# Patient Record
Sex: MALE | Race: WHITE | NOT HISPANIC OR LATINO
[De-identification: names, ages, dates, MRNs, and addresses within clinical notes are randomized per-mention and may not be internally consistent; named-entity substitution may affect disease eponyms.]

---

## 2017-06-29 ENCOUNTER — TRANSCRIPTION ENCOUNTER (OUTPATIENT)
Age: 72
End: 2017-06-29

## 2017-08-08 ENCOUNTER — TRANSCRIPTION ENCOUNTER (OUTPATIENT)
Age: 72
End: 2017-08-08

## 2018-10-20 ENCOUNTER — TRANSCRIPTION ENCOUNTER (OUTPATIENT)
Age: 73
End: 2018-10-20

## 2018-11-01 ENCOUNTER — TRANSCRIPTION ENCOUNTER (OUTPATIENT)
Age: 73
End: 2018-11-01

## 2018-11-07 ENCOUNTER — TRANSCRIPTION ENCOUNTER (OUTPATIENT)
Age: 73
End: 2018-11-07

## 2018-11-08 ENCOUNTER — RESULT REVIEW (OUTPATIENT)
Age: 73
End: 2018-11-08

## 2018-11-08 ENCOUNTER — OUTPATIENT (OUTPATIENT)
Dept: OUTPATIENT SERVICES | Facility: HOSPITAL | Age: 73
LOS: 1 days | End: 2018-11-08
Payer: MEDICARE

## 2018-11-08 DIAGNOSIS — D48.1 NEOPLASM OF UNCERTAIN BEHAVIOR OF CONNECTIVE AND OTHER SOFT TISSUE: ICD-10-CM

## 2018-11-08 PROCEDURE — 21931 EXC BACK LES SC 3 CM/>: CPT

## 2018-11-08 PROCEDURE — 88304 TISSUE EXAM BY PATHOLOGIST: CPT

## 2018-11-08 PROCEDURE — 88304 TISSUE EXAM BY PATHOLOGIST: CPT | Mod: 26

## 2018-11-12 LAB — SURGICAL PATHOLOGY FINAL REPORT - CH: SIGNIFICANT CHANGE UP

## 2019-03-07 ENCOUNTER — RECORD ABSTRACTING (OUTPATIENT)
Age: 74
End: 2019-03-07

## 2019-03-07 DIAGNOSIS — Z86.59 PERSONAL HISTORY OF OTHER MENTAL AND BEHAVIORAL DISORDERS: ICD-10-CM

## 2019-03-07 DIAGNOSIS — Z78.9 OTHER SPECIFIED HEALTH STATUS: ICD-10-CM

## 2019-03-07 DIAGNOSIS — E56.9 VITAMIN DEFICIENCY, UNSPECIFIED: ICD-10-CM

## 2019-03-07 DIAGNOSIS — Z95.2 PRESENCE OF PROSTHETIC HEART VALVE: ICD-10-CM

## 2019-03-07 DIAGNOSIS — Z80.1 FAMILY HISTORY OF MALIGNANT NEOPLASM OF TRACHEA, BRONCHUS AND LUNG: ICD-10-CM

## 2019-03-07 RX ORDER — CALCIUM CARBONATE/VITAMIN D3 600 MG-10
TABLET ORAL
Refills: 0 | Status: ACTIVE | COMMUNITY

## 2019-03-07 RX ORDER — MULTIVITAMIN
TABLET ORAL DAILY
Refills: 0 | Status: ACTIVE | COMMUNITY

## 2019-03-07 RX ORDER — ADHESIVE TAPE 3"X 2.3 YD
50 MCG TAPE, NON-MEDICATED TOPICAL
Refills: 0 | Status: ACTIVE | COMMUNITY

## 2019-03-15 ENCOUNTER — APPOINTMENT (OUTPATIENT)
Dept: INTERNAL MEDICINE | Facility: CLINIC | Age: 74
End: 2019-03-15
Payer: MEDICARE

## 2019-03-15 VITALS
DIASTOLIC BLOOD PRESSURE: 80 MMHG | BODY MASS INDEX: 30.61 KG/M2 | HEART RATE: 88 BPM | SYSTOLIC BLOOD PRESSURE: 130 MMHG | WEIGHT: 195 LBS | HEIGHT: 67 IN | OXYGEN SATURATION: 98 % | RESPIRATION RATE: 16 BRPM

## 2019-03-15 DIAGNOSIS — Z80.3 FAMILY HISTORY OF MALIGNANT NEOPLASM OF BREAST: ICD-10-CM

## 2019-03-15 DIAGNOSIS — Z80.8 FAMILY HISTORY OF MALIGNANT NEOPLASM OF OTHER ORGANS OR SYSTEMS: ICD-10-CM

## 2019-03-15 PROCEDURE — 99213 OFFICE O/P EST LOW 20 MIN: CPT

## 2019-03-15 NOTE — PHYSICAL EXAM
[No Hernias] : no hernias [de-identified] : Burn scar left lower extremity shin area [No Acute Distress] : no acute distress [Well Nourished] : well nourished [Well Developed] : well developed [Well-Appearing] : well-appearing [Normal Sclera/Conjunctiva] : normal sclera/conjunctiva [PERRL] : pupils equal round and reactive to light [EOMI] : extraocular movements intact [Normal Outer Ear/Nose] : the outer ears and nose were normal in appearance [Normal Oropharynx] : the oropharynx was normal [No JVD] : no jugular venous distention [Supple] : supple [No Lymphadenopathy] : no lymphadenopathy [Thyroid Normal, No Nodules] : the thyroid was normal and there were no nodules present [No Respiratory Distress] : no respiratory distress  [Clear to Auscultation] : lungs were clear to auscultation bilaterally [No Accessory Muscle Use] : no accessory muscle use [Normal Rate] : normal rate  [Regular Rhythm] : with a regular rhythm [Normal S1, S2] : normal S1 and S2 [No Carotid Bruits] : no carotid bruits [No Abdominal Bruit] : a ~M bruit was not heard ~T in the abdomen [No Varicosities] : no varicosities [Pedal Pulses Present] : the pedal pulses are present [No Edema] : there was no peripheral edema [No Extremity Clubbing/Cyanosis] : no extremity clubbing/cyanosis [No Palpable Aorta] : no palpable aorta [Soft] : abdomen soft [Non Tender] : non-tender [Non-distended] : non-distended [No Masses] : no abdominal mass palpated [No HSM] : no HSM [Normal Bowel Sounds] : normal bowel sounds [Normal Posterior Cervical Nodes] : no posterior cervical lymphadenopathy [Normal Anterior Cervical Nodes] : no anterior cervical lymphadenopathy [No CVA Tenderness] : no CVA  tenderness [No Spinal Tenderness] : no spinal tenderness [No Joint Swelling] : no joint swelling [Grossly Normal Strength/Tone] : grossly normal strength/tone [No Rash] : no rash [Normal Gait] : normal gait [Coordination Grossly Intact] : coordination grossly intact [No Focal Deficits] : no focal deficits [Deep Tendon Reflexes (DTR)] : deep tendon reflexes were 2+ and symmetric [Normal Affect] : the affect was normal [Normal Insight/Judgement] : insight and judgment were intact

## 2019-03-15 NOTE — HISTORY OF PRESENT ILLNESS
[FreeTextEntry8] : A 73-year-old male who presents to the office for evaluation of cold like symptoms for a week. Patient states he had a little nasal congestion slight cough and some acid reflux. Patient states the cough and nasal congestion has improved drastically. But still with acid reflux. Patient states that he's been taking Axid 300 mg daily which has been helping greatly.\par Patient states he's been seen in urology for evaluation of an abnormal MRI of the prostate. He is waiting to be scheduled for prostate biopsy.

## 2019-03-15 NOTE — ASSESSMENT
[FreeTextEntry1] : A 73-year-old male who presents to the office for evaluation of his reflux and cold-like symptoms

## 2019-03-15 NOTE — REVIEW OF SYSTEMS
[Heartburn] : heartburn [FreeTextEntry8] : elevated psa  [Nasal Discharge] : nasal discharge [Cough] : cough [Negative] : Heme/Lymph [FreeTextEntry7] : elevated PSA

## 2019-03-15 NOTE — HEALTH RISK ASSESSMENT
[de-identified] : blink 2-3 days aweek [] : No [No falls in past year] : Patient reported no falls in the past year [0] : 2) Feeling down, depressed, or hopeless: Not at all (0) [de-identified] : regular diet

## 2019-03-15 NOTE — HEALTH RISK ASSESSMENT
[de-identified] : blink 2-3 days aweek [] : No [No falls in past year] : Patient reported no falls in the past year [0] : 2) Feeling down, depressed, or hopeless: Not at all (0) [de-identified] : regular diet

## 2019-03-15 NOTE — PHYSICAL EXAM
[No Hernias] : no hernias [de-identified] : Burn scar left lower extremity shin area [No Acute Distress] : no acute distress [Well Nourished] : well nourished [Well Developed] : well developed [Well-Appearing] : well-appearing [Normal Sclera/Conjunctiva] : normal sclera/conjunctiva [PERRL] : pupils equal round and reactive to light [EOMI] : extraocular movements intact [Normal Outer Ear/Nose] : the outer ears and nose were normal in appearance [Normal Oropharynx] : the oropharynx was normal [No JVD] : no jugular venous distention [Supple] : supple [No Lymphadenopathy] : no lymphadenopathy [Thyroid Normal, No Nodules] : the thyroid was normal and there were no nodules present [No Respiratory Distress] : no respiratory distress  [Clear to Auscultation] : lungs were clear to auscultation bilaterally [No Accessory Muscle Use] : no accessory muscle use [Normal Rate] : normal rate  [Regular Rhythm] : with a regular rhythm [Normal S1, S2] : normal S1 and S2 [No Carotid Bruits] : no carotid bruits [No Abdominal Bruit] : a ~M bruit was not heard ~T in the abdomen [No Varicosities] : no varicosities [Pedal Pulses Present] : the pedal pulses are present [No Edema] : there was no peripheral edema [No Extremity Clubbing/Cyanosis] : no extremity clubbing/cyanosis [No Palpable Aorta] : no palpable aorta [Soft] : abdomen soft [Non Tender] : non-tender [Non-distended] : non-distended [No Masses] : no abdominal mass palpated [No HSM] : no HSM [Normal Bowel Sounds] : normal bowel sounds [Normal Posterior Cervical Nodes] : no posterior cervical lymphadenopathy [Normal Anterior Cervical Nodes] : no anterior cervical lymphadenopathy [No CVA Tenderness] : no CVA  tenderness [No Spinal Tenderness] : no spinal tenderness [No Joint Swelling] : no joint swelling [Grossly Normal Strength/Tone] : grossly normal strength/tone [No Rash] : no rash [Normal Gait] : normal gait [Coordination Grossly Intact] : coordination grossly intact [No Focal Deficits] : no focal deficits [Deep Tendon Reflexes (DTR)] : deep tendon reflexes were 2+ and symmetric [Normal Affect] : the affect was normal [Normal Insight/Judgement] : insight and judgment were intact

## 2019-03-15 NOTE — PLAN
[FreeTextEntry1] : Ears nose throat-  exam unremarkable. More likely viral etiology. Advised increased fluids and rest. If symptoms seem to be progressing to call the office for reevaluation.\par \par GI- GERD-  advised patient to low acid diet.  Refilled Axid 300 mg daily. Advised patient to eat small frequent meals. Avoid lying down after eating. If not improving follow up with gastroenterology for an endoscopy.\par \par Urology-elevated PSA- abnormal MRI of the prostate. Advised patient to follow with urologist. Return to office for medical clearance for prostate biopsy.

## 2019-03-15 NOTE — COUNSELING
[Weight management counseling provided] : Weight management [Healthy eating counseling provided] : healthy eating [Good understanding] : Patient has a good understanding of disease, goals and obesity follow-up plan [Low Fat Diet] : Low fat diet [Low Salt Diet] : Low salt diet [Decrease Portions] : Decrease food portions [Walking] : Walking

## 2019-03-29 ENCOUNTER — APPOINTMENT (OUTPATIENT)
Dept: INTERNAL MEDICINE | Facility: CLINIC | Age: 74
End: 2019-03-29
Payer: MEDICARE

## 2019-03-29 ENCOUNTER — NON-APPOINTMENT (OUTPATIENT)
Age: 74
End: 2019-03-29

## 2019-03-29 VITALS
RESPIRATION RATE: 16 BRPM | WEIGHT: 197 LBS | HEART RATE: 96 BPM | SYSTOLIC BLOOD PRESSURE: 130 MMHG | OXYGEN SATURATION: 98 % | HEIGHT: 67 IN | TEMPERATURE: 97.9 F | DIASTOLIC BLOOD PRESSURE: 80 MMHG | BODY MASS INDEX: 30.92 KG/M2

## 2019-03-29 DIAGNOSIS — Z87.898 PERSONAL HISTORY OF OTHER SPECIFIED CONDITIONS: ICD-10-CM

## 2019-03-29 DIAGNOSIS — N40.2 NODULAR PROSTATE W/OUT LOWER URINARY TRACT SYMPTOMS: ICD-10-CM

## 2019-03-29 PROCEDURE — 36415 COLL VENOUS BLD VENIPUNCTURE: CPT

## 2019-03-29 PROCEDURE — 99214 OFFICE O/P EST MOD 30 MIN: CPT | Mod: 25

## 2019-03-29 PROCEDURE — 81003 URINALYSIS AUTO W/O SCOPE: CPT | Mod: QW

## 2019-03-29 PROCEDURE — 93000 ELECTROCARDIOGRAM COMPLETE: CPT

## 2019-03-29 RX ORDER — ESCITALOPRAM OXALATE 5 MG/1
5 TABLET, FILM COATED ORAL DAILY
Refills: 0 | Status: DISCONTINUED | COMMUNITY
End: 2019-03-29

## 2019-03-29 RX ORDER — CHLORDIAZEPOXIDE HYDROCHLORIDE AND CLIDINIUM BROMIDE 5; 2.5 MG/1; MG/1
5-2.5 CAPSULE ORAL
Refills: 0 | Status: DISCONTINUED | COMMUNITY
End: 2019-03-29

## 2019-03-29 NOTE — RESULTS/DATA
[] : results reviewed [de-identified] : EKG normal sinus rhythm at 90 bpm, poor R-wave progression. No change from prior work up with cardiologist 10 years ago.

## 2019-03-29 NOTE — PLAN
[FreeTextEntry1] : 1. Preoperative EKG performed - see above - no change from prior \par 2.. The patient was advised to stop medications/supplements 10 day prior to the procedure, except Tylenol. \par 3. There is no medical contraindication to the planned procedure and the patient is therefore medically optimized and is therefore medically cleared for the procedure.\par

## 2019-03-29 NOTE — HISTORY OF PRESENT ILLNESS
[No Pertinent Cardiac History] : no history of aortic stenosis, atrial fibrillation, coronary artery disease, recent myocardial infarction, or implantable device/pacemaker [No Pertinent Pulmonary History] : no history of asthma, COPD, sleep apnea, or smoking [No Adverse Anesthesia Reaction] : no adverse anesthesia reaction in self or family member [FreeTextEntry1] : Prostate Biopsy [FreeTextEntry2] : 4/16/19 [FreeTextEntry3] : Dr. Darrin Fortune [FreeTextEntry4] : Patient is a 73 year year old  male with a past medical history as below who presents for preoperative evaluation prior to prostate biopsy procedure. The procedure will be performed by Dr. Fortune. Recent prostate MRI was abnormal. Patient recently had a UTI that resolved. The patient has no history of allergy or adverse reaction to anesthesia. The patient can walk many blocks and can walk up one to 2 flights of stairs without dyspnea on exertion. The patient denies chest pain or palpitations. The patient states he has stopped taking Lexapro. He states he is not currently taking any medications aside from Axid for heartburn/reflux. The patient states he takes Vitamin D, fish oil, and multi B-vitamins. He also states that he attends the gym 2-3 times per week.

## 2019-03-29 NOTE — ADDENDUM
[FreeTextEntry1] : I, Marine Mahamed, acted solely as scribe for Dr. Kelvin Pimentel DO on this date Mar 29 2019  2:00PM .\par \par All medical record entries made by the Scribe were at my, Dr. Kelvin Pimentel DO direction and personally dictated by me on Mar 29 2019  2:00PM . I have reviewed the chart and agree that the record accurately reflects my personal performance of the history, physical exam, assessment and plan. I have also personally directed, reviewed and agreed with the chart.

## 2019-03-29 NOTE — PHYSICAL EXAM

## 2019-03-29 NOTE — ASSESSMENT
[FreeTextEntry4] : The patient is 73 year male who is a low risk surgical candidate with good functional capacity going for a low-intermediate risk surgical procedure.\par

## 2019-04-02 LAB
ALBUMIN SERPL ELPH-MCNC: 4.5 G/DL
ALP BLD-CCNC: 95 U/L
ALT SERPL-CCNC: 28 U/L
ANION GAP SERPL CALC-SCNC: 15 MMOL/L
APPEARANCE: CLEAR
AST SERPL-CCNC: 25 U/L
BASOPHILS # BLD AUTO: 0.04 K/UL
BASOPHILS NFR BLD AUTO: 0.5 %
BILIRUB SERPL-MCNC: 0.4 MG/DL
BILIRUBIN URINE: NEGATIVE
BLOOD URINE: NEGATIVE
BUN SERPL-MCNC: 21 MG/DL
CALCIUM SERPL-MCNC: 10 MG/DL
CHLORIDE SERPL-SCNC: 103 MMOL/L
CO2 SERPL-SCNC: 24 MMOL/L
COLOR: NORMAL
CREAT SERPL-MCNC: 1.45 MG/DL
EOSINOPHIL # BLD AUTO: 0.46 K/UL
EOSINOPHIL NFR BLD AUTO: 5.6 %
GLUCOSE QUALITATIVE U: NEGATIVE
GLUCOSE SERPL-MCNC: 97 MG/DL
HCT VFR BLD CALC: 46.8 %
HGB BLD-MCNC: 13.8 G/DL
IMM GRANULOCYTES NFR BLD AUTO: 0.4 %
INR PPP: 0.97 RATIO
KETONES URINE: NEGATIVE
LEUKOCYTE ESTERASE URINE: NEGATIVE
LYMPHOCYTES # BLD AUTO: 2.14 K/UL
LYMPHOCYTES NFR BLD AUTO: 26.1 %
MAN DIFF?: NORMAL
MCHC RBC-ENTMCNC: 19.1 PG
MCHC RBC-ENTMCNC: 29.5 GM/DL
MCV RBC AUTO: 64.9 FL
MONOCYTES # BLD AUTO: 0.85 K/UL
MONOCYTES NFR BLD AUTO: 10.4 %
NEUTROPHILS # BLD AUTO: 4.68 K/UL
NEUTROPHILS NFR BLD AUTO: 57 %
NITRITE URINE: NEGATIVE
PH URINE: 5
PLATELET # BLD AUTO: 226 K/UL
POTASSIUM SERPL-SCNC: 5.3 MMOL/L
PROT SERPL-MCNC: 7.5 G/DL
PROTEIN URINE: NEGATIVE
PSA FREE FLD-MCNC: 16 %
PSA FREE SERPL-MCNC: 1.72 NG/ML
PSA SERPL-MCNC: 10.6 NG/ML
PT BLD: 11 SEC
RBC # BLD: 7.21 M/UL
RBC # FLD: 20.2 %
SODIUM SERPL-SCNC: 142 MMOL/L
SPECIFIC GRAVITY URINE: 1.02
UROBILINOGEN URINE: NORMAL
WBC # FLD AUTO: 8.2 K/UL

## 2019-05-30 ENCOUNTER — OUTPATIENT (OUTPATIENT)
Dept: OUTPATIENT SERVICES | Facility: HOSPITAL | Age: 74
LOS: 1 days | Discharge: HOME | End: 2019-05-30

## 2019-06-01 ENCOUNTER — TRANSCRIPTION ENCOUNTER (OUTPATIENT)
Age: 74
End: 2019-06-01

## 2019-06-03 ENCOUNTER — APPOINTMENT (OUTPATIENT)
Dept: RADIATION ONCOLOGY | Facility: CLINIC | Age: 74
End: 2019-06-03

## 2019-12-18 ENCOUNTER — APPOINTMENT (OUTPATIENT)
Dept: INTERNAL MEDICINE | Facility: CLINIC | Age: 74
End: 2019-12-18
Payer: MEDICARE

## 2019-12-18 VITALS
HEART RATE: 90 BPM | TEMPERATURE: 97.4 F | RESPIRATION RATE: 16 BRPM | HEIGHT: 67 IN | WEIGHT: 200 LBS | BODY MASS INDEX: 31.39 KG/M2 | SYSTOLIC BLOOD PRESSURE: 128 MMHG | OXYGEN SATURATION: 98 % | DIASTOLIC BLOOD PRESSURE: 82 MMHG

## 2019-12-18 DIAGNOSIS — Z92.3 PERSONAL HISTORY OF IRRADIATION: ICD-10-CM

## 2019-12-18 DIAGNOSIS — J06.9 ACUTE UPPER RESPIRATORY INFECTION, UNSPECIFIED: ICD-10-CM

## 2019-12-18 DIAGNOSIS — B97.89 ACUTE UPPER RESPIRATORY INFECTION, UNSPECIFIED: ICD-10-CM

## 2019-12-18 PROCEDURE — 99214 OFFICE O/P EST MOD 30 MIN: CPT | Mod: 25

## 2019-12-18 PROCEDURE — G0444 DEPRESSION SCREEN ANNUAL: CPT | Mod: 59

## 2019-12-18 PROCEDURE — G0442 ANNUAL ALCOHOL SCREEN 15 MIN: CPT | Mod: 59

## 2019-12-18 NOTE — REVIEW OF SYSTEMS
[Negative] : Heme/Lymph [Sore Throat] : sore throat [Earache] : earache [Cough] : cough [Fever] : no fever [Chills] : no chills [FreeTextEntry4] : nasal congestion

## 2019-12-18 NOTE — ADDENDUM
[FreeTextEntry1] : I, Kael Ferreira, acted solely as scribe for Dr. Kelvin Pimentel DO on this date 12/18/2019  7:00AM .\par \par All medical record entries made by the Scribe were at my, Dr. Kelvin Pimentel DO direction and personally dictated by me on 12/18/2019  7:00AM. I have reviewed the chart and agree that the record accurately reflects my personal performance of the history, physical exam, assessment and plan. I have also personally directed, reviewed and agreed with the chart.\par

## 2019-12-18 NOTE — HEALTH RISK ASSESSMENT
[No] : In the past 12 months have you used drugs other than those required for medical reasons? No [0] : 2) Feeling down, depressed, or hopeless: Not at all (0) [] : No [Audit-CScore] : 0 [[id number]] : 0

## 2019-12-18 NOTE — HISTORY OF PRESENT ILLNESS
[FreeTextEntry1] : cough [de-identified] : Patient is a 74 year old male with a past medical history as below who presents with a cough that started about 5 days ago. He also notes nasal congestion, a sore throat, and earache that began 2 weeks ago. He denies expectorating mucus, fever, or chills. Patient states taking Tessalon Perles have helped with the cough. He notes a family member had also recently been displaying similar symptoms. Patient states he is taking all medications as prescribed and denies any adverse reactions or side effects. GERD is well-managed on Nizatidine. He states he was on a statin in the past, but was taken off the medication as he had noted side effects. Patient is s/p radiation therapy (48 treatments) for prostate cancer. He notes an upcoming appointment with his radiation oncologist. He notes recently restarting vitamins/supplements as he is off Lupron.

## 2019-12-18 NOTE — PHYSICAL EXAM
[No Acute Distress] : no acute distress [Well Nourished] : well nourished [Well Developed] : well developed [Normal Sclera/Conjunctiva] : normal sclera/conjunctiva [PERRL] : pupils equal round and reactive to light [Well-Appearing] : well-appearing [EOMI] : extraocular movements intact [Normal Outer Ear/Nose] : the outer ears and nose were normal in appearance [No Lymphadenopathy] : no lymphadenopathy [No JVD] : no jugular venous distention [Supple] : supple [Thyroid Normal, No Nodules] : the thyroid was normal and there were no nodules present [No Respiratory Distress] : no respiratory distress  [Clear to Auscultation] : lungs were clear to auscultation bilaterally [No Accessory Muscle Use] : no accessory muscle use [Normal Rate] : normal rate  [Regular Rhythm] : with a regular rhythm [Normal S1, S2] : normal S1 and S2 [No Murmur] : no murmur heard [No Carotid Bruits] : no carotid bruits [No Abdominal Bruit] : a ~M bruit was not heard ~T in the abdomen [Pedal Pulses Present] : the pedal pulses are present [No Varicosities] : no varicosities [No Edema] : there was no peripheral edema [No Palpable Aorta] : no palpable aorta [Soft] : abdomen soft [Non Tender] : non-tender [No Extremity Clubbing/Cyanosis] : no extremity clubbing/cyanosis [Non-distended] : non-distended [No Masses] : no abdominal mass palpated [No HSM] : no HSM [Normal Bowel Sounds] : normal bowel sounds [Normal Anterior Cervical Nodes] : no anterior cervical lymphadenopathy [Normal Posterior Cervical Nodes] : no posterior cervical lymphadenopathy [No Spinal Tenderness] : no spinal tenderness [No CVA Tenderness] : no CVA  tenderness [No Joint Swelling] : no joint swelling [Grossly Normal Strength/Tone] : grossly normal strength/tone [No Rash] : no rash [Normal Gait] : normal gait [Coordination Grossly Intact] : coordination grossly intact [No Focal Deficits] : no focal deficits [Normal Affect] : the affect was normal [Deep Tendon Reflexes (DTR)] : deep tendon reflexes were 2+ and symmetric [Normal Insight/Judgement] : insight and judgment were intact [de-identified] : post-nasal drip  [de-identified] : obese

## 2019-12-18 NOTE — ASSESSMENT
[FreeTextEntry1] : Patient is a 74 year old male with a past medical history as above who presents with an upper respiratory tract infection, likely the common cold.

## 2019-12-18 NOTE — PLAN
[FreeTextEntry1] : ENT\par URI/cold - recommended staying well-hydrated and increased rest - patient to follow up if symptoms persist or worsen \par cough - likely secondary to post-nasal drip - continue Tessalon Perles 100mg QID p.o. p.r.n., Rx filled;  recommended non-sedating antihistamine (OTC Claritin, Allegra, or Zyrtec)\par Oncology\par history of prostate cancer - s/p radiation therapy - follow up with radiation oncologist \par Endocrinology\par continue vitamin D-3 2000 unit capsules p.o.q.d. with meals \par Gastroenterology\par GERD - continue Nizatidine 300mg p.o.q.h.s. as directed - continue antireflux measures\par Infectious Disease\par flu vaccine - discussed, patient to follow up as symptoms improve \par \par Patient to follow up for fasting blood work. \par

## 2020-01-21 ENCOUNTER — APPOINTMENT (OUTPATIENT)
Dept: INTERNAL MEDICINE | Facility: CLINIC | Age: 75
End: 2020-01-21
Payer: MEDICARE

## 2020-01-21 ENCOUNTER — NON-APPOINTMENT (OUTPATIENT)
Age: 75
End: 2020-01-21

## 2020-01-21 ENCOUNTER — LABORATORY RESULT (OUTPATIENT)
Age: 75
End: 2020-01-21

## 2020-01-21 VITALS
HEIGHT: 67 IN | DIASTOLIC BLOOD PRESSURE: 74 MMHG | OXYGEN SATURATION: 97 % | SYSTOLIC BLOOD PRESSURE: 136 MMHG | HEART RATE: 88 BPM | RESPIRATION RATE: 16 BRPM | WEIGHT: 195 LBS | BODY MASS INDEX: 30.61 KG/M2 | TEMPERATURE: 97.3 F

## 2020-01-21 DIAGNOSIS — Z11.4 ENCOUNTER FOR SCREENING FOR HUMAN IMMUNODEFICIENCY VIRUS [HIV]: ICD-10-CM

## 2020-01-21 DIAGNOSIS — Z11.59 ENCOUNTER FOR SCREENING FOR OTHER VIRAL DISEASES: ICD-10-CM

## 2020-01-21 PROCEDURE — 93000 ELECTROCARDIOGRAM COMPLETE: CPT

## 2020-01-21 PROCEDURE — G0439: CPT

## 2020-01-21 PROCEDURE — 36415 COLL VENOUS BLD VENIPUNCTURE: CPT

## 2020-01-21 NOTE — HEALTH RISK ASSESSMENT
[No] : No [0] : 1) Little interest or pleasure doing things: Not at all (0) [HIV Test offered] : HIV Test offered [Hepatitis C test offered] : Hepatitis C test offered [] : No [Audit-CScore] : 0 [[id number]] : 0

## 2020-01-21 NOTE — PLAN
[FreeTextEntry1] : Endocrinology\par hyperlipidemia - continue low cholesterol/low fat diet - check FLP \par continue vitamin D-3 2000 unit capsules p.o.q.d. with meals - check vitamin D\par Oncology/Urology\par prostate cancer - s/p radiation therapy - check PSA - continue to follow up with radiation oncologist, Dr. Pinzon \par urinary frequency/urgency - continue Tamsulosin HCl 0.4mg p.o.q.d. - check PSA - follow up with urologist, Dr. Torres\par Gastroenterology\par diarrhea, vomiting, abdominal cramping - likely secondary to resolving viral gastroenteritis - advised patient to stay well-hydrated - advised to follow up if symptoms persist or worsen \par GERD - continue Nizatidine 300mg p.o.q.h.s. - continue antireflux measures\par Infectious Disease\par check HIV AG/AB screen by [facility] and Hepatitis C Ab\par \par check EKG (results as above), male panel, HIV AG/AB screen by [facility], Hepatitis C Ab, and UA

## 2020-01-21 NOTE — REVIEW OF SYSTEMS
[Vomiting] : vomiting [Diarrhea] : diarrhea [Frequency] : frequency [Dizziness] : dizziness [Negative] : Heme/Lymph [FreeTextEntry7] : abdominal cramping  [FreeTextEntry2] : feeling weak [FreeTextEntry8] : urinary urgency

## 2020-01-21 NOTE — HISTORY OF PRESENT ILLNESS
[FreeTextEntry1] : annual physical exam\par  [de-identified] : Patient is a 74 year old male with a past medical history as below who presents for annual physical exam. Patient states he is taking all medications as prescribed and denies any adverse reactions or side effects. GERD is well-managed on Nizatidine. Patient notes being started on Tamsulosin for urinary frequency/urgency (previously 8x per day). He states urinary symptoms began after starting radiation therapy for prostate cancer. Patient sees radiation oncologist, Dr. Pinzon and notes he will be seeing urologist, Dr. Torres next week. Patient had noted abdominal cramping, diarrhea (no blood), vomiting, dizziness, and feeling weak 2-3 days ago. He denies dysphagia to liquids or solids and believes symptoms were secondary to a resolving viral gastroenteritis.  \par

## 2020-01-21 NOTE — PHYSICAL EXAM
[Well Nourished] : well nourished [No Acute Distress] : no acute distress [Well Developed] : well developed [Well-Appearing] : well-appearing [Normal Sclera/Conjunctiva] : normal sclera/conjunctiva [PERRL] : pupils equal round and reactive to light [EOMI] : extraocular movements intact [Normal Outer Ear/Nose] : the outer ears and nose were normal in appearance [Normal Oropharynx] : the oropharynx was normal [No JVD] : no jugular venous distention [No Lymphadenopathy] : no lymphadenopathy [Supple] : supple [Thyroid Normal, No Nodules] : the thyroid was normal and there were no nodules present [No Respiratory Distress] : no respiratory distress  [No Accessory Muscle Use] : no accessory muscle use [Clear to Auscultation] : lungs were clear to auscultation bilaterally [Normal Rate] : normal rate  [Regular Rhythm] : with a regular rhythm [Normal S1, S2] : normal S1 and S2 [No Murmur] : no murmur heard [No Carotid Bruits] : no carotid bruits [No Abdominal Bruit] : a ~M bruit was not heard ~T in the abdomen [No Varicosities] : no varicosities [Pedal Pulses Present] : the pedal pulses are present [No Edema] : there was no peripheral edema [No Palpable Aorta] : no palpable aorta [No Extremity Clubbing/Cyanosis] : no extremity clubbing/cyanosis [Soft] : abdomen soft [Non Tender] : non-tender [Non-distended] : non-distended [No Masses] : no abdominal mass palpated [No HSM] : no HSM [Normal Bowel Sounds] : normal bowel sounds [Normal Posterior Cervical Nodes] : no posterior cervical lymphadenopathy [Normal Anterior Cervical Nodes] : no anterior cervical lymphadenopathy [No CVA Tenderness] : no CVA  tenderness [No Spinal Tenderness] : no spinal tenderness [No Joint Swelling] : no joint swelling [Grossly Normal Strength/Tone] : grossly normal strength/tone [No Rash] : no rash [Coordination Grossly Intact] : coordination grossly intact [No Focal Deficits] : no focal deficits [Deep Tendon Reflexes (DTR)] : deep tendon reflexes were 2+ and symmetric [Normal Gait] : normal gait [Normal Affect] : the affect was normal [Normal Insight/Judgement] : insight and judgment were intact [de-identified] : obese

## 2020-01-21 NOTE — ADDENDUM
[FreeTextEntry1] : I, Kael Ferreira, acted solely as scribe for Dr. Kelvin Pimentel DO on this date 01/21/2020  9:00AM .\par \par All medical record entries made by the Scribe were at my, Dr. Kelvin Pimentel DO direction and personally dictated by me on 01/21/2020  9:00AM. I have reviewed the chart and agree that the record accurately reflects my personal performance of the history, physical exam, assessment and plan. I have also personally directed, reviewed and agreed with the chart.\par

## 2020-01-21 NOTE — ASSESSMENT
[FreeTextEntry1] : Patient is a 74 year old male with a past medical history as above who presents for annual physical exam.\par

## 2020-01-26 LAB
25(OH)D3 SERPL-MCNC: 33.8 NG/ML
ALBUMIN SERPL ELPH-MCNC: 4.8 G/DL
ALP BLD-CCNC: 113 U/L
ALT SERPL-CCNC: 77 U/L
ANION GAP SERPL CALC-SCNC: 19 MMOL/L
APPEARANCE: ABNORMAL
AST SERPL-CCNC: 55 U/L
BASOPHILS # BLD AUTO: 0.02 K/UL
BASOPHILS NFR BLD AUTO: 0.3 %
BILIRUB SERPL-MCNC: 0.6 MG/DL
BILIRUBIN URINE: NEGATIVE
BLOOD URINE: NEGATIVE
BUN SERPL-MCNC: 27 MG/DL
CALCIUM SERPL-MCNC: 10.8 MG/DL
CHLORIDE SERPL-SCNC: 104 MMOL/L
CHOLEST SERPL-MCNC: 224 MG/DL
CHOLEST/HDLC SERPL: 4.8 RATIO
CO2 SERPL-SCNC: 20 MMOL/L
COLOR: YELLOW
CREAT SERPL-MCNC: 1.29 MG/DL
EOSINOPHIL # BLD AUTO: 0.37 K/UL
EOSINOPHIL NFR BLD AUTO: 6.4 %
ESTIMATED AVERAGE GLUCOSE: 128 MG/DL
GLUCOSE QUALITATIVE U: NEGATIVE
GLUCOSE SERPL-MCNC: 105 MG/DL
HBA1C MFR BLD HPLC: 6.1 %
HCT VFR BLD CALC: 42.8 %
HCV AB SER QL: NONREACTIVE
HCV S/CO RATIO: 0.25 S/CO
HDLC SERPL-MCNC: 47 MG/DL
HGB BLD-MCNC: 12.5 G/DL
HIV1+2 AB SPEC QL IA.RAPID: NONREACTIVE
IMM GRANULOCYTES NFR BLD AUTO: 0.3 %
KETONES URINE: NEGATIVE
LDLC SERPL CALC-MCNC: 118 MG/DL
LEUKOCYTE ESTERASE URINE: NEGATIVE
LYMPHOCYTES # BLD AUTO: 0.82 K/UL
LYMPHOCYTES NFR BLD AUTO: 14.3 %
MAN DIFF?: NORMAL
MCHC RBC-ENTMCNC: 19.3 PG
MCHC RBC-ENTMCNC: 29.2 GM/DL
MCV RBC AUTO: 66.2 FL
MONOCYTES # BLD AUTO: 0.71 K/UL
MONOCYTES NFR BLD AUTO: 12.4 %
NEUTROPHILS # BLD AUTO: 3.8 K/UL
NEUTROPHILS NFR BLD AUTO: 66.3 %
NITRITE URINE: NEGATIVE
PH URINE: 5
PLATELET # BLD AUTO: 213 K/UL
POTASSIUM SERPL-SCNC: 4.6 MMOL/L
PROT SERPL-MCNC: 7.7 G/DL
PROTEIN URINE: NEGATIVE
PSA SERPL-MCNC: 0.19 NG/ML
RBC # BLD: 6.47 M/UL
RBC # FLD: 18.7 %
SODIUM SERPL-SCNC: 143 MMOL/L
SPECIFIC GRAVITY URINE: 1.03
TRIGL SERPL-MCNC: 295 MG/DL
TSH SERPL-ACNC: 0.92 UIU/ML
UROBILINOGEN URINE: NORMAL
WBC # FLD AUTO: 5.74 K/UL

## 2020-06-08 ENCOUNTER — APPOINTMENT (OUTPATIENT)
Dept: INTERNAL MEDICINE | Facility: CLINIC | Age: 75
End: 2020-06-08
Payer: MEDICARE

## 2020-06-08 VITALS
HEART RATE: 104 BPM | WEIGHT: 193 LBS | TEMPERATURE: 98.8 F | RESPIRATION RATE: 18 BRPM | BODY MASS INDEX: 30.29 KG/M2 | HEIGHT: 67 IN | SYSTOLIC BLOOD PRESSURE: 130 MMHG | OXYGEN SATURATION: 99 % | DIASTOLIC BLOOD PRESSURE: 80 MMHG

## 2020-06-08 PROCEDURE — 99213 OFFICE O/P EST LOW 20 MIN: CPT | Mod: 25

## 2020-06-08 PROCEDURE — 90471 IMMUNIZATION ADMIN: CPT

## 2020-06-08 PROCEDURE — 90715 TDAP VACCINE 7 YRS/> IM: CPT | Mod: GY

## 2020-06-08 NOTE — HISTORY OF PRESENT ILLNESS
[Spouse] : spouse [FreeTextEntry1] : Tdap (Adacel) Vaccine [de-identified] : Patient is a 74 year old male with a past medical history as below who presents for the Tdap (Adacel) Vaccine given his daughter is due to give birth in early July. Patient states he is taking all medications as prescribed and denies any adverse reactions or side effects. GERD is well-managed on Nizatidine. Patient states he feels well overall with no new complaints.

## 2020-06-08 NOTE — PHYSICAL EXAM
[No Acute Distress] : no acute distress [Well Nourished] : well nourished [Well Developed] : well developed [Well-Appearing] : well-appearing [Normal Sclera/Conjunctiva] : normal sclera/conjunctiva [Normal Outer Ear/Nose] : the outer ears and nose were normal in appearance [EOMI] : extraocular movements intact [PERRL] : pupils equal round and reactive to light [Normal Oropharynx] : the oropharynx was normal [No JVD] : no jugular venous distention [No Lymphadenopathy] : no lymphadenopathy [Supple] : supple [No Accessory Muscle Use] : no accessory muscle use [Thyroid Normal, No Nodules] : the thyroid was normal and there were no nodules present [No Respiratory Distress] : no respiratory distress  [Clear to Auscultation] : lungs were clear to auscultation bilaterally [Normal Rate] : normal rate  [Regular Rhythm] : with a regular rhythm [Normal S1, S2] : normal S1 and S2 [No Murmur] : no murmur heard [No Abdominal Bruit] : a ~M bruit was not heard ~T in the abdomen [No Carotid Bruits] : no carotid bruits [Pedal Pulses Present] : the pedal pulses are present [No Varicosities] : no varicosities [No Edema] : there was no peripheral edema [No Palpable Aorta] : no palpable aorta [No Extremity Clubbing/Cyanosis] : no extremity clubbing/cyanosis [Soft] : abdomen soft [Non-distended] : non-distended [Non Tender] : non-tender [No Masses] : no abdominal mass palpated [Normal Bowel Sounds] : normal bowel sounds [No HSM] : no HSM [Normal Posterior Cervical Nodes] : no posterior cervical lymphadenopathy [Normal Anterior Cervical Nodes] : no anterior cervical lymphadenopathy [No CVA Tenderness] : no CVA  tenderness [No Joint Swelling] : no joint swelling [No Spinal Tenderness] : no spinal tenderness [No Rash] : no rash [Grossly Normal Strength/Tone] : grossly normal strength/tone [Coordination Grossly Intact] : coordination grossly intact [No Focal Deficits] : no focal deficits [Normal Gait] : normal gait [Normal Affect] : the affect was normal [Deep Tendon Reflexes (DTR)] : deep tendon reflexes were 2+ and symmetric [Normal Insight/Judgement] : insight and judgment were intact [de-identified] : obese

## 2020-06-08 NOTE — PLAN
[FreeTextEntry1] : Infectious Disease\par Tdap (Adacel) Vaccine - 0.5mL x 1 administered intramuscularly \par Endocrinology\par hyperlipidemia - continue low cholesterol/low fat diet\par continue vitamin D-3 2000 unit capsules p.o.q.d. with meals \par Oncology/Urology\par prostate cancer - s/p radiation therapy - continue to follow up with radiation oncologist, Dr. Pinzon \par urinary frequency/urgency - continue Tamsulosin HCl 0.4mg p.o.q.d. - follow up with urologist, Dr. Torres \par Gastroenterology\par GERD - continue Nizatidine 300mg p.o.q.h.s. - continue antireflux measures\par

## 2020-06-08 NOTE — ADDENDUM
[FreeTextEntry1] : I, Kael Ferreira, acted solely as scribe for Dr. Kelvin Pimentel DO on this date 06/08/2020  3:30PM .\par \par All medical record entries made by the Scribe were at my, Dr. Kelvin Pimentel DO direction and personally dictated by me on 06/08/2020  3:30PM. I have reviewed the chart and agree that the record accurately reflects my personal performance of the history, physical exam, assessment and plan. I have also personally directed, reviewed and agreed with the chart.\par

## 2020-06-08 NOTE — ASSESSMENT
[FreeTextEntry1] : Patient is a 74 year old male with a past medical history as above who presents for the Tdap (Adacel) Vaccine.

## 2020-06-08 NOTE — HEALTH RISK ASSESSMENT
[No] : In the past 12 months have you used drugs other than those required for medical reasons? No [0] : 2) Feeling down, depressed, or hopeless: Not at all (0) [HIV Test offered] : HIV Test offered [Hepatitis C test offered] : Hepatitis C test offered [] : No [Audit-CScore] : 0 [[id number]] : 0

## 2020-08-26 ENCOUNTER — TRANSCRIPTION ENCOUNTER (OUTPATIENT)
Age: 75
End: 2020-08-26

## 2020-09-01 ENCOUNTER — APPOINTMENT (OUTPATIENT)
Dept: INTERNAL MEDICINE | Facility: CLINIC | Age: 75
End: 2020-09-01

## 2020-09-02 ENCOUNTER — APPOINTMENT (OUTPATIENT)
Dept: INTERNAL MEDICINE | Facility: CLINIC | Age: 75
End: 2020-09-02
Payer: MEDICARE

## 2020-09-02 VITALS
SYSTOLIC BLOOD PRESSURE: 140 MMHG | TEMPERATURE: 98 F | RESPIRATION RATE: 16 BRPM | WEIGHT: 194.38 LBS | DIASTOLIC BLOOD PRESSURE: 82 MMHG | HEART RATE: 94 BPM | OXYGEN SATURATION: 98 % | HEIGHT: 67 IN | BODY MASS INDEX: 30.51 KG/M2

## 2020-09-02 PROCEDURE — 99214 OFFICE O/P EST MOD 30 MIN: CPT | Mod: 25

## 2020-09-02 PROCEDURE — 36415 COLL VENOUS BLD VENIPUNCTURE: CPT

## 2020-09-02 RX ORDER — NIZATIDINE 300 MG/1
300 CAPSULE ORAL
Qty: 30 | Refills: 0 | Status: DISCONTINUED | COMMUNITY
Start: 2019-03-15 | End: 2020-09-02

## 2020-09-02 RX ORDER — TAMSULOSIN HYDROCHLORIDE 0.4 MG/1
0.4 CAPSULE ORAL
Qty: 90 | Refills: 0 | Status: DISCONTINUED | COMMUNITY
End: 2020-09-02

## 2020-09-02 NOTE — HEALTH RISK ASSESSMENT
[No] : No [0] : 2) Feeling down, depressed, or hopeless: Not at all (0) [] : No [Audit-CScore] : 0 [[id number]] : 0 [ColonoscopyComments] : Small internal hemorrhoids.  [ColonoscopyDate] : 01/17

## 2020-09-02 NOTE — ADDENDUM
[FreeTextEntry1] : I, Kael Ferreira, acted solely as scribe for Dr. Kelvin Pimentel DO on this date 09/02/2020 11:30AM .\par \par All medical record entries made by the Scribe were at my, Dr. Kelvin Pimentel DO direction and personally dictated by me on 09/02/2020 11:30AM. I have reviewed the chart and agree that the record accurately reflects my personal performance of the history, physical exam, assessment and plan. I have also personally directed, reviewed and agreed with the chart.\par

## 2020-09-02 NOTE — REVIEW OF SYSTEMS
[Recent Change In Weight] : ~T recent weight change [Diarrhea] : diarrhea [Dizziness] : dizziness [Negative] : Heme/Lymph [FreeTextEntry7] : abdominal cramping; gas; loose stool; acid reflux [FreeTextEntry2] : lost 5 lbs

## 2020-09-02 NOTE — ASSESSMENT
[FreeTextEntry1] : Patient is a 74 year old male with a past medical history as above who presents for abdominal pain, diarrhea, and GERD.

## 2020-09-02 NOTE — PHYSICAL EXAM
[No Acute Distress] : no acute distress [Well Nourished] : well nourished [Well Developed] : well developed [Well-Appearing] : well-appearing [Normal Sclera/Conjunctiva] : normal sclera/conjunctiva [PERRL] : pupils equal round and reactive to light [EOMI] : extraocular movements intact [Normal Outer Ear/Nose] : the outer ears and nose were normal in appearance [Normal Oropharynx] : the oropharynx was normal [No JVD] : no jugular venous distention [No Lymphadenopathy] : no lymphadenopathy [Supple] : supple [Thyroid Normal, No Nodules] : the thyroid was normal and there were no nodules present [No Respiratory Distress] : no respiratory distress  [No Accessory Muscle Use] : no accessory muscle use [Clear to Auscultation] : lungs were clear to auscultation bilaterally [Normal Rate] : normal rate  [Regular Rhythm] : with a regular rhythm [Normal S1, S2] : normal S1 and S2 [No Murmur] : no murmur heard [No Carotid Bruits] : no carotid bruits [No Abdominal Bruit] : a ~M bruit was not heard ~T in the abdomen [No Varicosities] : no varicosities [Pedal Pulses Present] : the pedal pulses are present [No Edema] : there was no peripheral edema [No Palpable Aorta] : no palpable aorta [No Extremity Clubbing/Cyanosis] : no extremity clubbing/cyanosis [Soft] : abdomen soft [No HSM] : no HSM [Non-distended] : non-distended [No Masses] : no abdominal mass palpated [Normal Anterior Cervical Nodes] : no anterior cervical lymphadenopathy [Normal Bowel Sounds] : normal bowel sounds [Normal Posterior Cervical Nodes] : no posterior cervical lymphadenopathy [No CVA Tenderness] : no CVA  tenderness [No Spinal Tenderness] : no spinal tenderness [No Joint Swelling] : no joint swelling [No Rash] : no rash [Grossly Normal Strength/Tone] : grossly normal strength/tone [No Focal Deficits] : no focal deficits [Coordination Grossly Intact] : coordination grossly intact [Normal Gait] : normal gait [Normal Affect] : the affect was normal [Normal Insight/Judgement] : insight and judgment were intact [de-identified] : obese; mild tenderness to deep palpation in periumbilical area without rebound or guarding

## 2020-09-02 NOTE — PLAN
[FreeTextEntry1] : Gastroenterology\par abdominal pain and diarrhea - check total serum amylase and serum lipase - Rx given for stool studies \par diarrhea - continue Loperamide HCl 2mg p.o. as directed \par GERD - continue Nexium (Esomeprazole) 20mg p.o. as directed - continue antireflux measures\par Cardiology\par hyperlipidemia - continue low cholesterol/low fat diet - check FLP\par hypertriglyceridemia - continue low cholesterol/low fat and low carbohydrate/low sugar diet - check FLP\par Endocrinology\par hyperglycemia - continue low carbohydrate/low sugar diet - check hemoglobin A1C\par continue vitamin D-3 2000 unit capsules p.o.q.d. with meals - check vitamin D \par Oncology/Urology\par prostate cancer - s/p radiation therapy - check PSA and free/total testosterone - continue to follow up with radiation oncologist, Dr. Silvestre and urologist, Dr. Torres \par \par check male panel and free/total testosterone

## 2020-09-02 NOTE — HISTORY OF PRESENT ILLNESS
[FreeTextEntry8] : Patient is a 74 year old male with a past medical history as below who presents for gastrointestinal issues that started in August. He had noted diffuse abdominal cramping and increased gas that lasted for 1 day and resolved on its own. He states symptoms recurred about 1 week later and again resolved on its own. Patient states symptoms recurred mostly recently this past week. He noted loose stool, an episode of diarrhea, acid reflux, abdominal cramping, and dizziness secondary to dehydration, but no fever or dysphagia to solids/liquids. He notes losing 5 lbs. He notes taking a dose of Carafate for the acid reflux. He also notes taking a dose of Xanax yesterday which helped. He states COVID-19 testing at an urgent care facility was negative. Patient is s/p radiation therapy for prostate cancer. His last Lupron injection was in November 2019. He follows up with radiation oncologist, Dr. Silvestre and urologist, Dr. Torres. His last blood work revealed PSA of 0.5.

## 2020-09-03 ENCOUNTER — LABORATORY RESULT (OUTPATIENT)
Age: 75
End: 2020-09-03

## 2020-09-05 LAB — BACTERIA STL CULT: NORMAL

## 2020-09-10 LAB
25(OH)D3 SERPL-MCNC: 45.5 NG/ML
ALBUMIN SERPL ELPH-MCNC: 4.7 G/DL
ALP BLD-CCNC: 119 U/L
ALT SERPL-CCNC: 28 U/L
AMYLASE/CREAT SERPL: 55 U/L
ANION GAP SERPL CALC-SCNC: 14 MMOL/L
AST SERPL-CCNC: 25 U/L
BASOPHILS # BLD AUTO: 0.12 K/UL
BASOPHILS NFR BLD AUTO: 1.8 %
BILIRUB SERPL-MCNC: 0.7 MG/DL
BUN SERPL-MCNC: 19 MG/DL
CALCIUM SERPL-MCNC: 9.8 MG/DL
CHLORIDE SERPL-SCNC: 105 MMOL/L
CHOLEST SERPL-MCNC: 171 MG/DL
CHOLEST/HDLC SERPL: 4.3 RATIO
CO2 SERPL-SCNC: 23 MMOL/L
CREAT SERPL-MCNC: 1.2 MG/DL
EOSINOPHIL # BLD AUTO: 0.17 K/UL
EOSINOPHIL NFR BLD AUTO: 2.6 %
ESTIMATED AVERAGE GLUCOSE: 111 MG/DL
GLUCOSE SERPL-MCNC: 99 MG/DL
HBA1C MFR BLD HPLC: 5.5 %
HCT VFR BLD CALC: 42.8 %
HDLC SERPL-MCNC: 40 MG/DL
HGB BLD-MCNC: 12.8 G/DL
LDLC SERPL CALC-MCNC: 96 MG/DL
LPL SERPL-CCNC: 26 U/L
LYMPHOCYTES # BLD AUTO: 1.33 K/UL
LYMPHOCYTES NFR BLD AUTO: 20.2 %
MAN DIFF?: NORMAL
MCHC RBC-ENTMCNC: 19.8 PG
MCHC RBC-ENTMCNC: 29.9 GM/DL
MCV RBC AUTO: 66.2 FL
MONOCYTES # BLD AUTO: 0.46 K/UL
MONOCYTES NFR BLD AUTO: 7 %
NEUTROPHILS # BLD AUTO: 4.49 K/UL
NEUTROPHILS NFR BLD AUTO: 68.4 %
PLATELET # BLD AUTO: 194 K/UL
POTASSIUM SERPL-SCNC: 4.2 MMOL/L
PROT SERPL-MCNC: 7.2 G/DL
PSA SERPL-MCNC: 0.73 NG/ML
RBC # BLD: 6.47 M/UL
RBC # FLD: 19.2 %
SODIUM SERPL-SCNC: 141 MMOL/L
TESTOST BND SERPL-MCNC: 4.5 PG/ML
TESTOST SERPL-MCNC: 378.4 NG/DL
TRIGL SERPL-MCNC: 178 MG/DL
TSH SERPL-ACNC: 0.45 UIU/ML
WBC # FLD AUTO: 6.57 K/UL

## 2020-10-01 ENCOUNTER — TRANSCRIPTION ENCOUNTER (OUTPATIENT)
Age: 75
End: 2020-10-01

## 2020-10-21 ENCOUNTER — TRANSCRIPTION ENCOUNTER (OUTPATIENT)
Age: 75
End: 2020-10-21

## 2020-12-21 PROBLEM — J06.9 VIRAL URI WITH COUGH: Status: RESOLVED | Noted: 2019-12-18 | Resolved: 2020-12-21

## 2021-01-30 ENCOUNTER — TRANSCRIPTION ENCOUNTER (OUTPATIENT)
Age: 76
End: 2021-01-30

## 2021-06-21 ENCOUNTER — TRANSCRIPTION ENCOUNTER (OUTPATIENT)
Age: 76
End: 2021-06-21

## 2021-09-21 ENCOUNTER — NON-APPOINTMENT (OUTPATIENT)
Age: 76
End: 2021-09-21

## 2021-09-21 ENCOUNTER — TRANSCRIPTION ENCOUNTER (OUTPATIENT)
Age: 76
End: 2021-09-21

## 2021-09-21 ENCOUNTER — LABORATORY RESULT (OUTPATIENT)
Age: 76
End: 2021-09-21

## 2021-09-21 ENCOUNTER — APPOINTMENT (OUTPATIENT)
Dept: INTERNAL MEDICINE | Facility: CLINIC | Age: 76
End: 2021-09-21
Payer: MEDICARE

## 2021-09-21 VITALS
DIASTOLIC BLOOD PRESSURE: 86 MMHG | TEMPERATURE: 96.6 F | HEIGHT: 67 IN | OXYGEN SATURATION: 98 % | BODY MASS INDEX: 29.85 KG/M2 | RESPIRATION RATE: 16 BRPM | WEIGHT: 190.2 LBS | HEART RATE: 88 BPM | SYSTOLIC BLOOD PRESSURE: 154 MMHG

## 2021-09-21 VITALS — DIASTOLIC BLOOD PRESSURE: 90 MMHG | SYSTOLIC BLOOD PRESSURE: 142 MMHG

## 2021-09-21 DIAGNOSIS — Z12.11 ENCOUNTER FOR SCREENING FOR MALIGNANT NEOPLASM OF COLON: ICD-10-CM

## 2021-09-21 PROCEDURE — 36415 COLL VENOUS BLD VENIPUNCTURE: CPT

## 2021-09-21 PROCEDURE — G0439: CPT

## 2021-09-21 PROCEDURE — 93000 ELECTROCARDIOGRAM COMPLETE: CPT | Mod: 59

## 2021-09-21 PROCEDURE — G0008: CPT

## 2021-09-21 PROCEDURE — G0442 ANNUAL ALCOHOL SCREEN 15 MIN: CPT | Mod: 59

## 2021-09-21 PROCEDURE — 90662 IIV NO PRSV INCREASED AG IM: CPT

## 2021-09-21 NOTE — HISTORY OF PRESENT ILLNESS
[FreeTextEntry1] : annual wellness visit [de-identified] : Patient is a 75 year old male with a past medical history as below who presents for an annual wellness visit. Patient is not currently taking any prescription medications. He is taking OTC Vitamin D-3 (2000 IU), Omega 3, and a Multivitamin. Patient's last screening colonoscopy was in January 2017 with gastroenterologist, Dr. Higginbotham. Patient notes upcoming appointment with urologist, Dr. Torres given history of prostate cancer, s/p radiation therapy. Patient inquires about receiving the flu vaccine today. He received the Pneumovax 23 on 3/27/17. He received the Tdap (Adacel) Vaccine on 6/8/29. He has not received the Shingles (Shingrix) Vaccine, but denies history of VZV infection. Patient has received both doses of the COVID-19 Vaccine (Pfizer). He inquires about testing for COVID-19 antibodies with blood work today.

## 2021-09-21 NOTE — ASSESSMENT
[FreeTextEntry1] : Patient is a 75 year old male with a past medical history as above who presents for an annual wellness visit.\par

## 2021-09-21 NOTE — PLAN
[FreeTextEntry1] : Cardiology\par elevated BP readings w/o Dx of HTN - check EKG (results as above) - advised low sodium diet; continue weight loss; RTO in 3 months for BP check\par history of hyperlipidemia - continue low cholesterol/low fat diet - check FLP \par hypertriglyceridemia - continue Omega 3 p.o.q.d. as directed - continue low cholesterol/low fat and low carbohydrate/low sugar diet - check FLP\par Endocrinology\par hyperglycemia - continue low carbohydrate/low sugar diet - check hemoglobin A1C\par continue Vitamin D-3 2000 IU p.o.q.d. with meals as directed - check Vitamin D \par Oncology/Urology\par prostate cancer - s/p radiation therapy - check PSA - continue to follow up with radiation oncologist, Dr. Silvestre and urologist, Dr. Torres; prostate examination to be performed by Dr. Torres \par Gastroenterology\par Referred to gastroenterologist, Dr. Alan for consultation prior to repeat screening colonoscopy; discussed FIT-DNA testing: kit given\par GERD - continue antireflux measures\par Immunization\par flu vaccine - Fluzone Quadrivalent High-Dose (65+) 0.7mL x 1 administered intramuscularly to left deltoid \par Immunization/Infectious Disease\par S/p COVID-19 Vaccine (Pfizer, x2) - check COVID-19 José Miguel Domain Antibody; recommended receiving 3rd dose of Vaccine 8 months after date of 2nd dose\par Check COVID-19 Nucleocapsid Antibody\par  \par check EKG (results as above), male panel, hemoglobin A1C, COVID-19 José Miguel Domain Antibody, COVID-19 Nucleocapsid Antibody, and UA w/ Reflex Urine Culture\par RTO in 3 months for BP check.

## 2021-09-21 NOTE — HEALTH RISK ASSESSMENT
[No] : In the past 12 months have you used drugs other than those required for medical reasons? No [0] : 2) Feeling down, depressed, or hopeless: Not at all (0) [PHQ-2 Negative - No further assessment needed] : PHQ-2 Negative - No further assessment needed [Patient declined bone density test] : Patient declined bone density test [Fully functional (bathing, dressing, toileting, transferring, walking, feeding)] : Fully functional (bathing, dressing, toileting, transferring, walking, feeding) [Fully functional (using the telephone, shopping, preparing meals, housekeeping, doing laundry, using] : Fully functional and needs no help or supervision to perform IADLs (using the telephone, shopping, preparing meals, housekeeping, doing laundry, using transportation, managing medications and managing finances) [] : No [Audit-CScore] : 0 [[id number]] : 0 [ColonoscopyDate] : 01/17 [ColonoscopyComments] : Small internal hemorrhoids; Referred to gastroenterologist, Dr. Alan for consultation prior to repeat screening; Also discussed FIT-DNA testing: kit given. [HIVDate] : 01/20 [HIVComments] : Nonreactive. [HepatitisCDate] : 01/20 [HepatitisCComments] : Nonreactive.

## 2021-09-21 NOTE — ADDENDUM
[FreeTextEntry1] : I, Kael Ferreira, acted solely as scribe for Dr. Kelvin Pimentel DO on this date 09/21/2021 12:10PM .\par \par All medical record entries made by the Scribe were at my, Dr. Kelvin Pimentel DO direction and personally dictated by me on 09/21/2021 12:10PM. I have reviewed the chart and agree that the record accurately reflects my personal performance of the history, physical exam, assessment and plan. I have also personally directed, reviewed and agreed with the chart.\par

## 2021-09-22 LAB
25(OH)D3 SERPL-MCNC: 56.1 NG/ML
ALBUMIN SERPL ELPH-MCNC: 4.7 G/DL
ALP BLD-CCNC: 93 U/L
ALT SERPL-CCNC: 28 U/L
ANION GAP SERPL CALC-SCNC: 21 MMOL/L
AST SERPL-CCNC: 24 U/L
BASOPHILS # BLD AUTO: 0.03 K/UL
BASOPHILS NFR BLD AUTO: 0.4 %
BILIRUB SERPL-MCNC: 1 MG/DL
BUN SERPL-MCNC: 15 MG/DL
CALCIUM SERPL-MCNC: 10.2 MG/DL
CHLORIDE SERPL-SCNC: 101 MMOL/L
CHOLEST SERPL-MCNC: 204 MG/DL
CO2 SERPL-SCNC: 20 MMOL/L
COVID-19 NUCLEOCAPSID  GAM ANTIBODY INTERPRETATION: NEGATIVE
COVID-19 SPIKE DOMAIN ANTIBODY INTERPRETATION: POSITIVE
CREAT SERPL-MCNC: 1.16 MG/DL
EOSINOPHIL # BLD AUTO: 0.42 K/UL
EOSINOPHIL NFR BLD AUTO: 5.1 %
ESTIMATED AVERAGE GLUCOSE: 114 MG/DL
GLUCOSE SERPL-MCNC: 76 MG/DL
HBA1C MFR BLD HPLC: 5.6 %
HCT VFR BLD CALC: 51.8 %
HDLC SERPL-MCNC: 42 MG/DL
HGB BLD-MCNC: 15 G/DL
IMM GRANULOCYTES NFR BLD AUTO: 0.2 %
LDLC SERPL CALC-MCNC: 108 MG/DL
LYMPHOCYTES # BLD AUTO: 1.71 K/UL
LYMPHOCYTES NFR BLD AUTO: 20.6 %
MAN DIFF?: NORMAL
MCHC RBC-ENTMCNC: 19.7 PG
MCHC RBC-ENTMCNC: 29 GM/DL
MCV RBC AUTO: 68.2 FL
MONOCYTES # BLD AUTO: 0.7 K/UL
MONOCYTES NFR BLD AUTO: 8.4 %
NEUTROPHILS # BLD AUTO: 5.43 K/UL
NEUTROPHILS NFR BLD AUTO: 65.3 %
NONHDLC SERPL-MCNC: 161 MG/DL
PLATELET # BLD AUTO: 201 K/UL
POTASSIUM SERPL-SCNC: 4.2 MMOL/L
PROT SERPL-MCNC: 7.7 G/DL
PSA SERPL-MCNC: 0.7 NG/ML
RBC # BLD: 7.6 M/UL
RBC # FLD: 20.6 %
SARS-COV-2 AB SERPL IA-ACNC: >250 U/ML
SARS-COV-2 AB SERPL QL IA: 0.08 INDEX
SODIUM SERPL-SCNC: 142 MMOL/L
TRIGL SERPL-MCNC: 268 MG/DL
TSH SERPL-ACNC: 0.4 UIU/ML
WBC # FLD AUTO: 8.31 K/UL

## 2021-09-24 ENCOUNTER — NON-APPOINTMENT (OUTPATIENT)
Age: 76
End: 2021-09-24

## 2021-10-06 ENCOUNTER — MED ADMIN CHARGE (OUTPATIENT)
Age: 76
End: 2021-10-06

## 2021-10-06 ENCOUNTER — RX CHANGE (OUTPATIENT)
Age: 76
End: 2021-10-06

## 2021-11-02 ENCOUNTER — TRANSCRIPTION ENCOUNTER (OUTPATIENT)
Age: 76
End: 2021-11-02

## 2022-02-05 ENCOUNTER — RX RENEWAL (OUTPATIENT)
Age: 77
End: 2022-02-05

## 2022-03-02 ENCOUNTER — RX RENEWAL (OUTPATIENT)
Age: 77
End: 2022-03-02

## 2022-03-25 ENCOUNTER — TRANSCRIPTION ENCOUNTER (OUTPATIENT)
Age: 77
End: 2022-03-25

## 2022-07-05 ENCOUNTER — NON-APPOINTMENT (OUTPATIENT)
Age: 77
End: 2022-07-05

## 2022-08-22 ENCOUNTER — APPOINTMENT (OUTPATIENT)
Dept: INTERNAL MEDICINE | Facility: CLINIC | Age: 77
End: 2022-08-22

## 2022-08-22 ENCOUNTER — LABORATORY RESULT (OUTPATIENT)
Age: 77
End: 2022-08-22

## 2022-08-22 VITALS
TEMPERATURE: 98.4 F | DIASTOLIC BLOOD PRESSURE: 86 MMHG | WEIGHT: 183 LBS | HEART RATE: 100 BPM | OXYGEN SATURATION: 99 % | RESPIRATION RATE: 13 BRPM | HEIGHT: 67 IN | SYSTOLIC BLOOD PRESSURE: 134 MMHG | BODY MASS INDEX: 28.72 KG/M2

## 2022-08-22 DIAGNOSIS — R63.4 ABNORMAL WEIGHT LOSS: ICD-10-CM

## 2022-08-22 PROCEDURE — 36415 COLL VENOUS BLD VENIPUNCTURE: CPT

## 2022-08-22 PROCEDURE — 99214 OFFICE O/P EST MOD 30 MIN: CPT | Mod: 25

## 2022-08-22 RX ORDER — CLONAZEPAM 0.5 MG/1
0.5 TABLET ORAL
Qty: 30 | Refills: 0 | Status: ACTIVE | COMMUNITY
Start: 2022-07-11

## 2022-08-22 RX ORDER — TADALAFIL 5 MG/1
5 TABLET ORAL
Qty: 90 | Refills: 0 | Status: ACTIVE | COMMUNITY
Start: 2022-05-10

## 2022-08-22 RX ORDER — PANTOPRAZOLE 40 MG/1
40 TABLET, DELAYED RELEASE ORAL
Qty: 90 | Refills: 0 | Status: ACTIVE | COMMUNITY
Start: 2022-03-19

## 2022-08-22 RX ORDER — FAMOTIDINE 20 MG/1
20 TABLET, FILM COATED ORAL
Qty: 180 | Refills: 0 | Status: ACTIVE | COMMUNITY
Start: 2021-11-12

## 2022-08-22 NOTE — ASSESSMENT
[FreeTextEntry1] : Patient is a 76 year old male with a past medical history as above who presents for fasting blood work and general follow-up.

## 2022-08-22 NOTE — REVIEW OF SYSTEMS
[Negative] : Heme/Lymph [Recent Change In Weight] : ~T recent weight change [FreeTextEntry2] : weight loss [FreeTextEntry9] : loss in muscle mass/tone

## 2022-08-22 NOTE — PLAN
[FreeTextEntry1] : Cardiology\par elevated BP readings w/o Dx of HTN - check EKG (results as above) - advised low sodium diet; will continue to monitor BP\par hypertriglyceridemia - continue low cholesterol/low fat and low carbohydrate/low sugar diet - check FLP\par Endocrinology\par hyperglycemia - continue low carbohydrate/low sugar diet - check hemoglobin A1C\par weight loss - check Free/Total Testosterone \par Continue Vitamin D-3 2000 IU p.o.q.d. with a meal as directed \par Oncology/Urology\par prostate cancer - s/p radiation/hormone therapy - check PSA - continue to follow up with radiation oncologist, Dr. Silvestre and urologist, Dr. Torres\par Gastroenterology\par Previously referred to gastroenterologist, Dr. Alan for consultation prior to repeat screening colonoscopy; also previously discussed FIT Test: kit was given\par GERD - continue antireflux measures\par  \par check CBC, CMP, FLP, hemoglobin A1C, PSA, and Free/Total Testosterone

## 2022-08-22 NOTE — HEALTH RISK ASSESSMENT
[Never] : Never [No] : In the past 12 months have you used drugs other than those required for medical reasons? No [0] : 2) Feeling down, depressed, or hopeless: Not at all (0) [PHQ-2 Negative - No further assessment needed] : PHQ-2 Negative - No further assessment needed [Patient declined bone density test] : Patient declined bone density test [Fully functional (bathing, dressing, toileting, transferring, walking, feeding)] : Fully functional (bathing, dressing, toileting, transferring, walking, feeding) [Fully functional (using the telephone, shopping, preparing meals, housekeeping, doing laundry, using] : Fully functional and needs no help or supervision to perform IADLs (using the telephone, shopping, preparing meals, housekeeping, doing laundry, using transportation, managing medications and managing finances) [Audit-CScore] : 0 [[id number]] : 0 [ColonoscopyDate] : 01/17 [ColonoscopyComments] : Small internal hemorrhoids; Previously referred to gastroenterologist, Dr. Alan for consultation prior to repeat screening; also previously discussed FIT Test: kit was given. [HIVDate] : 01/20 [HIVComments] : Nonreactive. [HepatitisCDate] : 01/20 [HepatitisCComments] : Nonreactive.

## 2022-08-22 NOTE — HISTORY OF PRESENT ILLNESS
[FreeTextEntry1] : fasting blood work and general follow-up [de-identified] : Patient is a 76 year old male with a past medical history as below who presents for fasting blood work and general follow-up. Patient regularly follows up with oncology/ given history of prostate cancer, s/p radiation/hormone therapy. Patient notes weight loss since last visit. He also notes loss in muscle mass/tone. He has been eating less than he had been in the past. He inquires about checking testosterone level with blood work today.

## 2022-08-22 NOTE — PHYSICAL EXAM
[No Acute Distress] : no acute distress [Well Nourished] : well nourished [Well Developed] : well developed [Well-Appearing] : well-appearing [Normal Sclera/Conjunctiva] : normal sclera/conjunctiva [PERRL] : pupils equal round and reactive to light [EOMI] : extraocular movements intact [Normal Outer Ear/Nose] : the outer ears and nose were normal in appearance [Normal Oropharynx] : the oropharynx was normal [No JVD] : no jugular venous distention [No Lymphadenopathy] : no lymphadenopathy [Supple] : supple [Thyroid Normal, No Nodules] : the thyroid was normal and there were no nodules present [No Respiratory Distress] : no respiratory distress  [No Accessory Muscle Use] : no accessory muscle use [Clear to Auscultation] : lungs were clear to auscultation bilaterally [Normal Rate] : normal rate  [Regular Rhythm] : with a regular rhythm [Normal S1, S2] : normal S1 and S2 [No Murmur] : no murmur heard [No Carotid Bruits] : no carotid bruits [No Abdominal Bruit] : a ~M bruit was not heard ~T in the abdomen [No Varicosities] : no varicosities [Pedal Pulses Present] : the pedal pulses are present [No Edema] : there was no peripheral edema [No Palpable Aorta] : no palpable aorta [No Extremity Clubbing/Cyanosis] : no extremity clubbing/cyanosis [Soft] : abdomen soft [Non Tender] : non-tender [Non-distended] : non-distended [No Masses] : no abdominal mass palpated [No HSM] : no HSM [Normal Bowel Sounds] : normal bowel sounds [Normal Posterior Cervical Nodes] : no posterior cervical lymphadenopathy [Normal Anterior Cervical Nodes] : no anterior cervical lymphadenopathy [No CVA Tenderness] : no CVA  tenderness [No Spinal Tenderness] : no spinal tenderness [No Joint Swelling] : no joint swelling [Grossly Normal Strength/Tone] : grossly normal strength/tone [No Rash] : no rash [Coordination Grossly Intact] : coordination grossly intact [No Focal Deficits] : no focal deficits [Normal Gait] : normal gait [Deep Tendon Reflexes (DTR)] : deep tendon reflexes were 2+ and symmetric [Normal Affect] : the affect was normal [Normal Insight/Judgement] : insight and judgment were intact [Normal Voice/Communication] : normal voice/communication [Normal TMs] : both tympanic membranes were normal [Normal Nasal Mucosa] : the nasal mucosa was normal [No Hernias] : no hernias [Normal Supraclavicular Nodes] : no supraclavicular lymphadenopathy [Speech Grossly Normal] : speech grossly normal [Memory Grossly Normal] : memory grossly normal [Alert and Oriented x3] : oriented to person, place, and time [Normal Mood] : the mood was normal [de-identified] : overweight

## 2022-08-22 NOTE — ADDENDUM
[FreeTextEntry1] : I, Kael Ferreira, acted solely as scribe for Dr. Kelvin Pimentel DO on this date 08/22/2022 12:40PM .\par \par All medical record entries made by the Scribe were at my, Dr. Kelvin Pimentel DO direction and personally dictated by me on 08/22/2022 12:40PM. I have reviewed the chart and agree that the record accurately reflects my personal performance of the history, physical exam, assessment and plan. I have also personally directed, reviewed and agreed with the chart.

## 2022-08-24 LAB
ALBUMIN SERPL ELPH-MCNC: 4.8 G/DL
ALP BLD-CCNC: 92 U/L
ALT SERPL-CCNC: 20 U/L
ANION GAP SERPL CALC-SCNC: 18 MMOL/L
AST SERPL-CCNC: 22 U/L
BASOPHILS # BLD AUTO: 0.02 K/UL
BASOPHILS NFR BLD AUTO: 0.3 %
BILIRUB SERPL-MCNC: 0.6 MG/DL
BUN SERPL-MCNC: 19 MG/DL
CALCIUM SERPL-MCNC: 10.5 MG/DL
CHLORIDE SERPL-SCNC: 104 MMOL/L
CHOLEST SERPL-MCNC: 185 MG/DL
CO2 SERPL-SCNC: 22 MMOL/L
CREAT SERPL-MCNC: 1.15 MG/DL
EGFR: 67 ML/MIN/1.73M2
EOSINOPHIL # BLD AUTO: 0.38 K/UL
EOSINOPHIL NFR BLD AUTO: 5.6 %
ESTIMATED AVERAGE GLUCOSE: 114 MG/DL
GLUCOSE SERPL-MCNC: 84 MG/DL
HBA1C MFR BLD HPLC: 5.6 %
HCT VFR BLD CALC: 48 %
HDLC SERPL-MCNC: 49 MG/DL
HGB BLD-MCNC: 14.3 G/DL
IMM GRANULOCYTES NFR BLD AUTO: 0.4 %
LDLC SERPL CALC-MCNC: 111 MG/DL
LYMPHOCYTES # BLD AUTO: 1.44 K/UL
LYMPHOCYTES NFR BLD AUTO: 21.2 %
MAN DIFF?: NORMAL
MCHC RBC-ENTMCNC: 20.1 PG
MCHC RBC-ENTMCNC: 29.8 GM/DL
MCV RBC AUTO: 67.4 FL
MONOCYTES # BLD AUTO: 0.69 K/UL
MONOCYTES NFR BLD AUTO: 10.2 %
NEUTROPHILS # BLD AUTO: 4.22 K/UL
NEUTROPHILS NFR BLD AUTO: 62.3 %
NONHDLC SERPL-MCNC: 135 MG/DL
PLATELET # BLD AUTO: 200 K/UL
POTASSIUM SERPL-SCNC: 4.4 MMOL/L
PROT SERPL-MCNC: 7.4 G/DL
PSA SERPL-MCNC: 0.79 NG/ML
RBC # BLD: 7.12 M/UL
RBC # FLD: 20 %
SODIUM SERPL-SCNC: 144 MMOL/L
TRIGL SERPL-MCNC: 120 MG/DL
WBC # FLD AUTO: 6.78 K/UL

## 2022-08-25 ENCOUNTER — NON-APPOINTMENT (OUTPATIENT)
Age: 77
End: 2022-08-25

## 2022-08-25 LAB
TESTOST FREE SERPL-MCNC: 7.6 PG/ML
TESTOST SERPL-MCNC: 366 NG/DL

## 2022-08-30 ENCOUNTER — FORM ENCOUNTER (OUTPATIENT)
Age: 77
End: 2022-08-30

## 2022-08-31 ENCOUNTER — APPOINTMENT (OUTPATIENT)
Dept: MRI IMAGING | Facility: CLINIC | Age: 77
End: 2022-08-31

## 2022-08-31 ENCOUNTER — APPOINTMENT (OUTPATIENT)
Dept: ORTHOPEDIC SURGERY | Facility: CLINIC | Age: 77
End: 2022-08-31

## 2022-08-31 VITALS — HEIGHT: 67 IN | BODY MASS INDEX: 28.25 KG/M2 | WEIGHT: 180 LBS

## 2022-08-31 PROCEDURE — 73221 MRI JOINT UPR EXTREM W/O DYE: CPT | Mod: RT

## 2022-08-31 PROCEDURE — 99204 OFFICE O/P NEW MOD 45 MIN: CPT

## 2022-08-31 PROCEDURE — 73030 X-RAY EXAM OF SHOULDER: CPT | Mod: RT

## 2022-08-31 NOTE — PHYSICAL EXAM
[Orientated] : orientated [Able to Communicate] : able to communicate [Normal Skin] : normal skin [Right] : right shoulder

## 2022-09-07 ENCOUNTER — APPOINTMENT (OUTPATIENT)
Dept: ORTHOPEDIC SURGERY | Facility: CLINIC | Age: 77
End: 2022-09-07

## 2022-09-07 VITALS — BODY MASS INDEX: 28.25 KG/M2 | WEIGHT: 180 LBS | HEIGHT: 67 IN

## 2022-09-07 PROCEDURE — 99213 OFFICE O/P EST LOW 20 MIN: CPT

## 2022-09-07 NOTE — ASSESSMENT
[FreeTextEntry1] : Recommend MRI R shoulder to eval RTC tear\par Mobic rx\par Fu for MRI review\par \par 09/07/2022 MRI reviewed and discussed. \par Activity modifier as tolerated.\par Start physical therapy to improve mechanics and reduce pain.\par Questions answered.\par surgical intervention not indicated at this time. \par patient confirms symptoms are decreased since initial onset.\par Resume mobic as needed. \par

## 2022-09-07 NOTE — HISTORY OF PRESENT ILLNESS
[Sudden] : sudden [6] : 6 [0] : 0 [Dull/Aching] : dull/aching [de-identified] : 9/7/22: MRI review of the right shoulder. \par \par This is a 77 YO male RHD, with right shoulder pain that gradually onset of yanking a lawnmower. +Motrin use. Some night symptoms. C/o of nagging pulling pain, into trap and triceps. Denies n/t. Denies prior history.  [FreeTextEntry5] : 8/27/22 ws pulling wire of the lawnmower and felt pain pain located lateral worse motion has tired Motrin

## 2022-09-07 NOTE — DISCUSSION/SUMMARY
[de-identified] : The documentation recorded by the scribe accurately reflects the service I personally performed and the decisions made by me.\par I, Jairo Chow, attest that this documentation has been prepared under the direction and in the presence of Provider Tommy Anguiano MD.\par The patient was seen by Dr. Anguiano\par

## 2022-09-07 NOTE — DATA REVIEWED
[MRI] : MRI [Right] : of the right [Shoulder] : shoulder [Report was reviewed and noted in the chart] : The report was reviewed and noted in the chart [I reviewed the films/CD and agree] : I reviewed the films/CD and agree [FreeTextEntry1] : Impression:  R \par 1. High-grade partial tearing and delamination of the supraspinatus tendon insertion with moderate surrounding \par bursitis without retraction or atrophy.\par 2. Superior labral tearing, infraspinatus and subscapularis tendinopathy and biceps tenosynovitis with mild \par effusion, capsulitis, bursitis and AC joint arthrosis.\par 3. No acute fracture or disproportionate muscle atrophy.\par Date of Dictation 09/01/2022/Electronically signed by Efra Saenz MD 09/02/2022 8:39:46 AM

## 2022-09-09 ENCOUNTER — NON-APPOINTMENT (OUTPATIENT)
Age: 77
End: 2022-09-09

## 2022-10-14 ENCOUNTER — APPOINTMENT (OUTPATIENT)
Dept: ORTHOPEDIC SURGERY | Facility: CLINIC | Age: 77
End: 2022-10-14

## 2022-10-14 VITALS — BODY MASS INDEX: 28.25 KG/M2 | WEIGHT: 180 LBS | HEIGHT: 67 IN

## 2022-10-14 PROCEDURE — 20610 DRAIN/INJ JOINT/BURSA W/O US: CPT | Mod: RT

## 2022-10-14 PROCEDURE — 99213 OFFICE O/P EST LOW 20 MIN: CPT | Mod: 25

## 2022-10-14 NOTE — HISTORY OF PRESENT ILLNESS
[Sudden] : sudden [3] : 3 [Dull/Aching] : dull/aching [Tightness] : tightness [Intermittent] : intermittent [Physical therapy] : physical therapy [Retired] : Work status: retired [de-identified] : 10/14/22:  Patient is here today for his right shoulder.  Had seen Dr. Anguiano two months ago who prescribed meloxicam and PT.  He did have an MRI.   States he had to stop the meloxicam after five days due to GERD and stomach upset.  Is doing a little better, but still with pain. \par \par Impression: \par 1. High-grade partial tearing and delamination of the supraspinatus tendon insertion with moderate surrounding bursitis without retraction or atrophy.\par 2. Superior labral tearing, infraspinatus and subscapularis tendinopathy and biceps tenosynovitis with mild effusion, capsulitis, bursitis and AC joint arthrosis.\par 3. No acute fracture or disproportionate muscle atrophy.\par \par 9/7/22: MRI review of the right shoulder. \par \par This is a 77 YO male RHD, with right shoulder pain that gradually onset of yanking a lawnmower. +Motrin use. Some night symptoms. C/o of nagging pulling pain, into trap and triceps. Denies n/t. Denies prior history.  [] : no [FreeTextEntry1] : right shoulder [de-identified] : leaning on arm [de-identified] : 9/7/22 [de-identified] : dr blackburn [de-identified] : 10/11/22 [de-identified] : PT

## 2022-10-14 NOTE — PHYSICAL EXAM
[Normal Mood and Affect] : normal mood and affect [Orientated] : orientated [Able to Communicate] : able to communicate [Normal Skin] : normal skin [Well Developed] : well developed [Well Nourished] : well nourished [Right] : right shoulder [NL ()] : external rotation at 90 degrees of abduction 50 -110 degrees [Supine] : supine [] : negative Hernandez [FreeTextEntry8] : SA tenderness [FreeTextEntry9] : IR T8 [de-identified] : +Spencer

## 2022-10-14 NOTE — REASON FOR VISIT
Patient requesting Sertraline refill. Last seen 1/11/21. Visit scheduled for 4/8. Scored 2 on PHQ-9 and 4 on DAVID-7. One refill provided to bridge until appointment, per RN refill protocol.   Sandra Rasmussen RN   
[FreeTextEntry2] : rRght shoulder pain past 6 weeks  when starting /Seen by Dr blackburn and MRI was ordered.

## 2022-10-19 ENCOUNTER — APPOINTMENT (OUTPATIENT)
Dept: ORTHOPEDIC SURGERY | Facility: CLINIC | Age: 77
End: 2022-10-19

## 2022-10-26 NOTE — HISTORY OF PRESENT ILLNESS
[Sudden] : sudden [6] : 6 [0] : 0 [Dull/Aching] : dull/aching [de-identified] : This is a 77 YO male RHD, with right shoulder pain that gradually onset of yanking a lawnmower. +Motrin use. Some night symptoms. C/o of nagging pulling pain, into trap and triceps. Denies n/t. Denies prior history.  [FreeTextEntry5] : 8/27/22 ws pulling wire of the lawnmower and felt pain pain located lateral worse motion has tired Motrin

## 2022-11-01 ENCOUNTER — APPOINTMENT (OUTPATIENT)
Dept: ORTHOPEDIC SURGERY | Facility: CLINIC | Age: 77
End: 2022-11-01

## 2022-11-01 DIAGNOSIS — M75.41 IMPINGEMENT SYNDROME OF RIGHT SHOULDER: ICD-10-CM

## 2022-11-01 DIAGNOSIS — M75.111 INCOMPLETE ROTATOR CUFF TEAR OR RUPTURE OF RIGHT SHOULDER, NOT SPECIFIED AS TRAUMATIC: ICD-10-CM

## 2022-11-01 PROCEDURE — 99214 OFFICE O/P EST MOD 30 MIN: CPT

## 2022-11-01 NOTE — HISTORY OF PRESENT ILLNESS
[Lower back] : lower back [Right Arm] : right arm [6] : 6 [Sharp] : sharp [Tightness] : tightness [Sleep] : sleep [Rest] : rest [Heat] : heat [Physical therapy] : physical therapy [Sitting] : sitting [de-identified] : 11/01/22:  Returns today two weeks after right shoulder cortisone injection. Feeling about 63 yo 70% better after injection.  Could not continue meloxicam due to GERD.  In PT for his shoulder 1-2x/week  and feels it helps.\par \par Also complaining of a flare-up of lower back pain.  No leg pain. His back pain improved after PT and then resolved with the summer weather, but is worsening again over the last month after the weather has become cooler and after playing with his 2 year old granddaughter.  Not taking any medication for pain.  Heating pad once in a while.  A hot shower helps.\par \par 10/14/22:  Patient is here today for his right shoulder.  Had seen Dr. Anguiano two months ago who prescribed meloxicam and PT.  He did have an MRI.   States he had to stop the meloxicam after five days due to GERD and stomach upset.  Is doing a little better, but still with pain. \par \par Impression: \par 1. High-grade partial tearing and delamination of the supraspinatus tendon insertion with moderate surrounding bursitis without retraction or atrophy.\par 2. Superior labral tearing, infraspinatus and subscapularis tendinopathy and biceps tenosynovitis with mild effusion, capsulitis, bursitis and AC joint arthrosis.\par 3. No acute fracture or disproportionate muscle atrophy.\par \par 9/7/22: MRI review of the right shoulder. \par \par This is a 75 YO male RHD, with right shoulder pain that gradually onset of yanking a lawnmower. +Motrin use. Some night symptoms. C/o of nagging pulling pain, into trap and triceps. Denies n/t. Denies prior history. \par \par 10/19/21: Return visit for a 75 year old male here today for acute onset of LBP x 1 week ago. Localized to LB. No leg pain. Constant and daily. Went for acupuncture x 2 visits w/ slight relief only. Wearing lumbar support. Worse in am getting OOB. No wake up pain at night. pain is a "5-6". Tried aleve 2 pills prn w/o relief.\par PMH: had acute LBP x 12 years ago after lifting a heavy tree trunk. has had recurring episodes 1-2x/year [] : no [FreeTextEntry8] : walking [de-identified] : walking longer distances [de-identified] : 10/2021 [de-identified] : [facility] [de-identified] : x-rays [de-identified] : None

## 2022-11-01 NOTE — PHYSICAL EXAM
[Normal Mood and Affect] : normal mood and affect [Orientated] : orientated [Able to Communicate] : able to communicate [Normal Skin] : normal skin [Well Developed] : well developed [Well Nourished] : well nourished [Right] : right shoulder [Supine] : supine [NL (0-180)] : full passive forward flexion 0-180 degrees [NL (0-90)] : full external rotation 0-90 degrees [NL (90)] : forward flexion 90 degrees [NL (30)] : right lateral bending 30 degrees [Flexion] : flexion [] : negative Hernandez [FreeTextEntry8] : SA tenderness [FreeTextEntry9] : IR T7 [de-identified] : Mild +Spencer

## 2022-11-08 ENCOUNTER — APPOINTMENT (OUTPATIENT)
Dept: INTERNAL MEDICINE | Facility: CLINIC | Age: 77
End: 2022-11-08

## 2022-11-08 VITALS
SYSTOLIC BLOOD PRESSURE: 128 MMHG | DIASTOLIC BLOOD PRESSURE: 82 MMHG | HEIGHT: 67 IN | WEIGHT: 184 LBS | OXYGEN SATURATION: 98 % | TEMPERATURE: 98.9 F | BODY MASS INDEX: 28.88 KG/M2 | RESPIRATION RATE: 15 BRPM | HEART RATE: 106 BPM

## 2022-11-08 DIAGNOSIS — R03.0 ELEVATED BLOOD-PRESSURE READING, W/OUT DIAGNOSIS OF HYPERTENSION: ICD-10-CM

## 2022-11-08 PROCEDURE — 99214 OFFICE O/P EST MOD 30 MIN: CPT

## 2022-11-08 NOTE — ASSESSMENT
[FreeTextEntry1] : Patient is a 76 year old male with a past medical history as above who presents for insomnia.

## 2022-11-08 NOTE — PHYSICAL EXAM
[No Acute Distress] : no acute distress [Well Nourished] : well nourished [Well Developed] : well developed [Well-Appearing] : well-appearing [Normal Sclera/Conjunctiva] : normal sclera/conjunctiva [PERRL] : pupils equal round and reactive to light [EOMI] : extraocular movements intact [Normal Outer Ear/Nose] : the outer ears and nose were normal in appearance [Normal Oropharynx] : the oropharynx was normal [No JVD] : no jugular venous distention [No Lymphadenopathy] : no lymphadenopathy [Supple] : supple [Thyroid Normal, No Nodules] : the thyroid was normal and there were no nodules present [No Respiratory Distress] : no respiratory distress  [No Accessory Muscle Use] : no accessory muscle use [Clear to Auscultation] : lungs were clear to auscultation bilaterally [Normal Rate] : normal rate  [Regular Rhythm] : with a regular rhythm [Normal S1, S2] : normal S1 and S2 [No Murmur] : no murmur heard [No Carotid Bruits] : no carotid bruits [No Abdominal Bruit] : a ~M bruit was not heard ~T in the abdomen [No Varicosities] : no varicosities [Pedal Pulses Present] : the pedal pulses are present [No Edema] : there was no peripheral edema [No Palpable Aorta] : no palpable aorta [No Extremity Clubbing/Cyanosis] : no extremity clubbing/cyanosis [Soft] : abdomen soft [Non Tender] : non-tender [Non-distended] : non-distended [No Masses] : no abdominal mass palpated [No HSM] : no HSM [Normal Bowel Sounds] : normal bowel sounds [Normal Posterior Cervical Nodes] : no posterior cervical lymphadenopathy [Normal Anterior Cervical Nodes] : no anterior cervical lymphadenopathy [No CVA Tenderness] : no CVA  tenderness [No Spinal Tenderness] : no spinal tenderness [No Joint Swelling] : no joint swelling [Grossly Normal Strength/Tone] : grossly normal strength/tone [No Rash] : no rash [Coordination Grossly Intact] : coordination grossly intact [No Focal Deficits] : no focal deficits [Normal Gait] : normal gait [Deep Tendon Reflexes (DTR)] : deep tendon reflexes were 2+ and symmetric [Normal Affect] : the affect was normal [Normal Insight/Judgement] : insight and judgment were intact [Normal Voice/Communication] : normal voice/communication [Normal TMs] : both tympanic membranes were normal [Normal Nasal Mucosa] : the nasal mucosa was normal [Normal Supraclavicular Nodes] : no supraclavicular lymphadenopathy [Kyphosis] : no kyphosis [Acne] : no acne [Speech Grossly Normal] : speech grossly normal [Memory Grossly Normal] : memory grossly normal [Alert and Oriented x3] : oriented to person, place, and time [Normal Mood] : the mood was normal [de-identified] : overweight

## 2022-11-08 NOTE — HISTORY OF PRESENT ILLNESS
[FreeTextEntry8] : Patient is a 76 year old male with a past medical history as below who presents for insomnia; started in 2019 following treatment for prostate cancer (Lupron, radiation therapy). He notes difficulty maintaining sleep. He denies any difficulty initiating sleep. He denies nocturia. He denies consuming caffeinated beverages frequently. He notes seeing psychiatrist, Dr. Freedman approximately 1 month ago who prescribed Trazodone 150mg. He took Trazodone 150mg 1/2 tablet and states his sleep did not improve. He had also noted dizziness after taking the medication. He also notes trying Melatonin without improvement. \par \par Patient notes recently seeing orthopedist, Dr. Grubbs regarding right shoulder pain, s/p cortisone injection. He was also prescribed Methylprednisolone for acute lower back pain.\par

## 2022-11-08 NOTE — HEALTH RISK ASSESSMENT
[Never] : Never [No] : In the past 12 months have you used drugs other than those required for medical reasons? No [0] : 2) Feeling down, depressed, or hopeless: Not at all (0) [PHQ-2 Negative - No further assessment needed] : PHQ-2 Negative - No further assessment needed [Patient declined bone density test] : Patient declined bone density test [Fully functional (bathing, dressing, toileting, transferring, walking, feeding)] : Fully functional (bathing, dressing, toileting, transferring, walking, feeding) [Fully functional (using the telephone, shopping, preparing meals, housekeeping, doing laundry, using] : Fully functional and needs no help or supervision to perform IADLs (using the telephone, shopping, preparing meals, housekeeping, doing laundry, using transportation, managing medications and managing finances) [No falls in past year] : Patient reported no falls in the past year [Audit-CScore] : 0 [[id number]] : 0 [ColonoscopyDate] : 01/17 [ColonoscopyComments] : Small internal hemorrhoids; Previously referred to gastroenterologist, Dr. Alan for consultation prior to repeat screening; also previously discussed IFOBT: kit was given. [HIVDate] : 01/20 [HIVComments] : Nonreactive. [HepatitisCDate] : 01/20 [HepatitisCComments] : Nonreactive.

## 2022-11-08 NOTE — PLAN
[FreeTextEntry1] : Psychiatry\par insomnia - discussed risks associated with taking benzodiazepine or sedative-hypnotic medication nightly - referred to Dr. Zambrano to further discuss benefits of medical marijuana \par Cardiology\par elevated BP readings w/o Dx of HTN - continue low sodium diet; will continue to monitor BP\par hypertriglyceridemia - advised low cholesterol/low fat and low carbohydrate/low sugar diet \par Endocrinology\par hyperglycemia - advised low carbohydrate/low sugar diet; previously recommended increasing CV exercise\par Continue Vitamin D-3 2000 IU p.o.q.d. with a meal as directed \par Oncology/Urology\par prostate cancer - s/p hormone therapy; s/p radiation therapy - continue to follow up with radiation oncologist, Dr. Silvestre and urologist, Dr. Torres\par Gastroenterology\par Previously referred to gastroenterologist, Dr. Alan for consultation prior to repeat screening colonoscopy; also previously discussed IFOBT: kit was given\par GERD - continue antireflux measures

## 2022-11-08 NOTE — ADDENDUM
[FreeTextEntry1] : I, Kael Ferreira, acted solely as scribe for Dr. Kelvin Pimentel DO on this date 11/08/2022  1:00PM .\par \par All medical record entries made by the Scribe were at my, Dr. Kelvin Pimentel DO direction and personally dictated by me on 11/08/2022  1:00PM. I have reviewed the chart and agree that the record accurately reflects my personal performance of the history, physical exam, assessment and plan. I have also personally directed, reviewed and agreed with the chart.

## 2022-11-25 ENCOUNTER — NON-APPOINTMENT (OUTPATIENT)
Age: 77
End: 2022-11-25

## 2022-12-03 ENCOUNTER — NON-APPOINTMENT (OUTPATIENT)
Age: 77
End: 2022-12-03

## 2023-02-16 ENCOUNTER — NON-APPOINTMENT (OUTPATIENT)
Age: 78
End: 2023-02-16

## 2023-02-17 ENCOUNTER — EMERGENCY (EMERGENCY)
Facility: HOSPITAL | Age: 78
LOS: 1 days | Discharge: HOME | End: 2023-02-17
Attending: EMERGENCY MEDICINE | Admitting: EMERGENCY MEDICINE
Payer: MEDICARE

## 2023-02-17 VITALS
RESPIRATION RATE: 15 BRPM | HEART RATE: 90 BPM | DIASTOLIC BLOOD PRESSURE: 75 MMHG | SYSTOLIC BLOOD PRESSURE: 160 MMHG | OXYGEN SATURATION: 98 %

## 2023-02-17 VITALS
DIASTOLIC BLOOD PRESSURE: 90 MMHG | TEMPERATURE: 98 F | HEART RATE: 102 BPM | HEIGHT: 67 IN | OXYGEN SATURATION: 96 % | WEIGHT: 182.98 LBS | RESPIRATION RATE: 18 BRPM | SYSTOLIC BLOOD PRESSURE: 150 MMHG

## 2023-02-17 LAB
ALBUMIN SERPL ELPH-MCNC: 3.4 G/DL — SIGNIFICANT CHANGE UP (ref 3.3–5)
ALP SERPL-CCNC: 72 U/L — SIGNIFICANT CHANGE UP (ref 30–120)
ALT FLD-CCNC: 17 U/L DA — SIGNIFICANT CHANGE UP (ref 10–60)
ANION GAP SERPL CALC-SCNC: 9 MMOL/L — SIGNIFICANT CHANGE UP (ref 5–17)
ANISOCYTOSIS BLD QL: SLIGHT — SIGNIFICANT CHANGE UP
APPEARANCE UR: CLEAR — SIGNIFICANT CHANGE UP
AST SERPL-CCNC: 18 U/L — SIGNIFICANT CHANGE UP (ref 10–40)
BACTERIA # UR AUTO: ABNORMAL
BASOPHILS # BLD AUTO: 0.03 K/UL — SIGNIFICANT CHANGE UP (ref 0–0.2)
BASOPHILS NFR BLD AUTO: 0.2 % — SIGNIFICANT CHANGE UP (ref 0–2)
BILIRUB SERPL-MCNC: 0.9 MG/DL — SIGNIFICANT CHANGE UP (ref 0.2–1.2)
BILIRUB UR-MCNC: NEGATIVE — SIGNIFICANT CHANGE UP
BUN SERPL-MCNC: 17 MG/DL — SIGNIFICANT CHANGE UP (ref 7–23)
CALCIUM SERPL-MCNC: 10 MG/DL — SIGNIFICANT CHANGE UP (ref 8.4–10.5)
CHLORIDE SERPL-SCNC: 101 MMOL/L — SIGNIFICANT CHANGE UP (ref 96–108)
CO2 SERPL-SCNC: 29 MMOL/L — SIGNIFICANT CHANGE UP (ref 22–31)
COLOR SPEC: YELLOW — SIGNIFICANT CHANGE UP
CREAT SERPL-MCNC: 1.37 MG/DL — HIGH (ref 0.5–1.3)
DIFF PNL FLD: ABNORMAL
EGFR: 54 ML/MIN/1.73M2 — LOW
ELLIPTOCYTES BLD QL SMEAR: SLIGHT — SIGNIFICANT CHANGE UP
EOSINOPHIL # BLD AUTO: 0.52 K/UL — HIGH (ref 0–0.5)
EOSINOPHIL NFR BLD AUTO: 4.1 % — SIGNIFICANT CHANGE UP (ref 0–6)
EPI CELLS # UR: SIGNIFICANT CHANGE UP
GLUCOSE SERPL-MCNC: 117 MG/DL — HIGH (ref 70–99)
GLUCOSE UR QL: NEGATIVE MG/DL — SIGNIFICANT CHANGE UP
HCT VFR BLD CALC: 40.8 % — SIGNIFICANT CHANGE UP (ref 39–50)
HGB BLD-MCNC: 12.9 G/DL — LOW (ref 13–17)
IMM GRANULOCYTES NFR BLD AUTO: 0.3 % — SIGNIFICANT CHANGE UP (ref 0–0.9)
KETONES UR-MCNC: ABNORMAL
LEUKOCYTE ESTERASE UR-ACNC: ABNORMAL
LIDOCAIN IGE QN: 136 U/L — SIGNIFICANT CHANGE UP (ref 73–393)
LYMPHOCYTES # BLD AUTO: 1.19 K/UL — SIGNIFICANT CHANGE UP (ref 1–3.3)
LYMPHOCYTES # BLD AUTO: 9.4 % — LOW (ref 13–44)
MANUAL SMEAR VERIFICATION: YES — SIGNIFICANT CHANGE UP
MCHC RBC-ENTMCNC: 19.9 PG — LOW (ref 27–34)
MCHC RBC-ENTMCNC: 31.6 GM/DL — LOW (ref 32–36)
MCV RBC AUTO: 63 FL — LOW (ref 80–100)
MICROCYTES BLD QL: SLIGHT — SIGNIFICANT CHANGE UP
MONOCYTES # BLD AUTO: 1.15 K/UL — HIGH (ref 0–0.9)
MONOCYTES NFR BLD AUTO: 9.1 % — SIGNIFICANT CHANGE UP (ref 2–14)
NEUTROPHILS # BLD AUTO: 9.77 K/UL — HIGH (ref 1.8–7.4)
NEUTROPHILS NFR BLD AUTO: 76.9 % — SIGNIFICANT CHANGE UP (ref 43–77)
NITRITE UR-MCNC: NEGATIVE — SIGNIFICANT CHANGE UP
NRBC # BLD: 0 /100 WBCS — SIGNIFICANT CHANGE UP (ref 0–0)
PH UR: 5 — SIGNIFICANT CHANGE UP (ref 5–8)
PLAT MORPH BLD: NORMAL — SIGNIFICANT CHANGE UP
PLATELET # BLD AUTO: 222 K/UL — SIGNIFICANT CHANGE UP (ref 150–400)
PLATELET COUNT - ESTIMATE: NORMAL — SIGNIFICANT CHANGE UP
POIKILOCYTOSIS BLD QL AUTO: SLIGHT — SIGNIFICANT CHANGE UP
POLYCHROMASIA BLD QL SMEAR: SLIGHT — SIGNIFICANT CHANGE UP
POTASSIUM SERPL-MCNC: 3.5 MMOL/L — SIGNIFICANT CHANGE UP (ref 3.5–5.3)
POTASSIUM SERPL-SCNC: 3.5 MMOL/L — SIGNIFICANT CHANGE UP (ref 3.5–5.3)
PROT SERPL-MCNC: 7.9 G/DL — SIGNIFICANT CHANGE UP (ref 6–8.3)
PROT UR-MCNC: 100 MG/DL
RBC # BLD: 6.48 M/UL — HIGH (ref 4.2–5.8)
RBC # FLD: 17.2 % — HIGH (ref 10.3–14.5)
RBC BLD AUTO: ABNORMAL
RBC CASTS # UR COMP ASSIST: ABNORMAL /HPF (ref 0–4)
SARS-COV-2 RNA SPEC QL NAA+PROBE: SIGNIFICANT CHANGE UP
SODIUM SERPL-SCNC: 139 MMOL/L — SIGNIFICANT CHANGE UP (ref 135–145)
SP GR SPEC: 1.02 — SIGNIFICANT CHANGE UP (ref 1.01–1.02)
TARGETS BLD QL SMEAR: SLIGHT — SIGNIFICANT CHANGE UP
UROBILINOGEN FLD QL: 1 MG/DL
WBC # BLD: 12.7 K/UL — HIGH (ref 3.8–10.5)
WBC # FLD AUTO: 12.7 K/UL — HIGH (ref 3.8–10.5)
WBC UR QL: SIGNIFICANT CHANGE UP

## 2023-02-17 PROCEDURE — 80053 COMPREHEN METABOLIC PANEL: CPT

## 2023-02-17 PROCEDURE — 96361 HYDRATE IV INFUSION ADD-ON: CPT

## 2023-02-17 PROCEDURE — 99285 EMERGENCY DEPT VISIT HI MDM: CPT

## 2023-02-17 PROCEDURE — 36415 COLL VENOUS BLD VENIPUNCTURE: CPT

## 2023-02-17 PROCEDURE — 74177 CT ABD & PELVIS W/CONTRAST: CPT | Mod: MA

## 2023-02-17 PROCEDURE — 99284 EMERGENCY DEPT VISIT MOD MDM: CPT | Mod: 25

## 2023-02-17 PROCEDURE — 83690 ASSAY OF LIPASE: CPT

## 2023-02-17 PROCEDURE — 74177 CT ABD & PELVIS W/CONTRAST: CPT | Mod: 26,MA

## 2023-02-17 PROCEDURE — 87635 SARS-COV-2 COVID-19 AMP PRB: CPT

## 2023-02-17 PROCEDURE — 96360 HYDRATION IV INFUSION INIT: CPT | Mod: XU

## 2023-02-17 PROCEDURE — 85025 COMPLETE CBC W/AUTO DIFF WBC: CPT

## 2023-02-17 PROCEDURE — 81001 URINALYSIS AUTO W/SCOPE: CPT

## 2023-02-17 RX ORDER — SODIUM CHLORIDE 9 MG/ML
1000 INJECTION INTRAMUSCULAR; INTRAVENOUS; SUBCUTANEOUS ONCE
Refills: 0 | Status: COMPLETED | OUTPATIENT
Start: 2023-02-17 | End: 2023-02-17

## 2023-02-17 RX ORDER — SIMETHICONE 80 MG/1
80 TABLET, CHEWABLE ORAL ONCE
Refills: 0 | Status: COMPLETED | OUTPATIENT
Start: 2023-02-17 | End: 2023-02-17

## 2023-02-17 RX ADMIN — SODIUM CHLORIDE 1000 MILLILITER(S): 9 INJECTION INTRAMUSCULAR; INTRAVENOUS; SUBCUTANEOUS at 14:55

## 2023-02-17 RX ADMIN — SODIUM CHLORIDE 1000 MILLILITER(S): 9 INJECTION INTRAMUSCULAR; INTRAVENOUS; SUBCUTANEOUS at 17:09

## 2023-02-17 RX ADMIN — SIMETHICONE 80 MILLIGRAM(S): 80 TABLET, CHEWABLE ORAL at 16:56

## 2023-02-17 NOTE — ED PROVIDER NOTE - DIFFERENTIAL DIAGNOSIS
Differential Diagnosis Differentials include but not limited to diverticulitis, colitis, enteritis, obstruction, appendicitis, viral illness

## 2023-02-17 NOTE — ED PROVIDER NOTE - CARE PROVIDER_API CALL
Javier Santos ()  Internal Medicine  [address] [place] [address]  Phone: [phone]  Fax: [phone]  Follow Up Time: 1-3 Days

## 2023-02-17 NOTE — ED PROVIDER NOTE - PROGRESS NOTE DETAILS
Overall pain much improved.  No abdominal tenderness on repeat exam.  Pain resolved after simethicone given in emergency room.  CAT scan showed findings of acute pancreatitis.  Lipase was normal.  Was seen and evaluated by Dr. Santos.  Suspects recently passed stone and pancreatitis is improving.  Due to no current pain or symptoms, states appropriate for discharge at this time with close follow-up with GI.  Return for any worsening symptoms.  Recommend liquid diet, advancing diet as tolerated.  Patient comfortable with plan.  Overall appears well. recommend outpatient MRI for further eval for pancreatitis.

## 2023-02-17 NOTE — ED PROVIDER NOTE - PATIENT PORTAL LINK FT
You can access the FollowMyHealth Patient Portal offered by [facility] by registering at the following website: [email or web address]. By joining [facility]’s FollowMyHealth portal, you will also be able to view your health information using other applications (apps) compatible with our system.

## 2023-02-17 NOTE — ED PROVIDER NOTE - NSFOLLOWUPINSTRUCTIONS_ED_ALL_ED_FT
Recommend liquid diet next 8 to 12 hours, advancing diet as tolerated  Recommend follow-up with GI, referral provided if needed  Recommend outpatient MRI for further evaluation of pancreatitis      Acute Pancreatitis    Body outline showing body parts used in digestion. A close-up shows a normal pancreas and a swollen pancreas.   Acute pancreatitis happens when there is sudden swelling and irritation of the pancreas. The pancreas is a gland in your body that helps to control blood sugar. This gland also helps to digest food.    This condition can last a few days and cause serious problems. Some problems can be life-threatening. The lungs, heart, and kidneys may stop working.      What are the causes?    Causes may include:  •Heavy alcohol use.      •Drug use.      •Gallstones.      •An abnormal growth of tissue (tumor) in the pancreas.      Other causes include:  •Some medicines or some chemicals.      •Diabetes or infection.      •High levels of a type of fat in your blood.      •High levels of calcium in your blood.    •Damage caused by:  •An accident.      •The poison (venom) from a scorpion sting.        •Belly (abdominal) surgery.      •The body's defense system (immune system) attacking the pancreas (autoimmune pancreatitis).      •Genes that are passed from parent to child (inherited).      Sometimes, the cause is not known.      What are the signs or symptoms?    •Pain in the upper belly that may be felt in the back. The pain may be very bad. It often gets worse after you eat.      •A tender and swollen belly.      •Feeling like you may vomit (nausea) and vomiting.      •Fever.        How is this treated?    •A stay in the hospital, in many cases.      •Pain medicine.      •Fluid through an IV tube.      •Placing a tube in the stomach to take out the stomach contents. This also helps you stop vomiting.      •Not eating until you vomit less.      •Antibiotic medicines, if you have an infection.      •Steroid medicines, if your problem is caused by attacks on your body's own tissues by your defense system.      •Surgery, if your problem is caused by gallstones or other blockage.      •Treating other health problems that may be the cause.        Follow these instructions at home:    Medicines     •Take over-the-counter and prescription medicines only as told by your doctor.      •If you were prescribed an antibiotic medicine, take it as told by your doctor. Do not stop taking it even if you start to feel better.    •If told, take steps to prevent problems with pooping (constipation). You may need to:   • Take medicines. You will be told what medicines to take.       •Eat foods that are high in fiber. These include beans, whole grains, and fresh fruits and vegetables.       •Limit foods that are high in fat and sugar. These include fried or sweet foods.         •Ask your doctor if you should avoid driving or using machines while you are taking your medicine.        Eating and drinking   Three cups showing dark yellow, yellow, and pale yellow urine. •Follow instructions from your doctor about what to eat and drink. You may need to:  •Avoid alcohol.      •Eat foods that do not have a lot of fat in them.        •Eat small meals often. Do not eat big meals.      •Drink enough fluid to keep your pee (urine) pale yellow.      • Do not drink alcohol if it caused your condition.      General instructions     • Do not smoke or use any products that contain nicotine or tobacco. If you need help quitting, ask your doctor.      •Get plenty of rest.      •Check your blood sugar at home if your doctor tells you to.      •Keep all follow-up visits.        Contact a doctor if:    •You do not get better as fast as expected.      •Your symptoms get worse.      •You have new symptoms.      •You have pain or weakness that lasts a long time.      •You keep feeling like you may vomit.      •You get better and then pain comes back.      •You have a fever.        Get help right away if:    •You vomit every time you eat or drink.      •Your pain gets very bad.      •Your skin or the white parts of your eyes turn yellow.      •You have sudden swelling in your belly.      •You feel dizzy or you faint.      •Your blood sugar is high (over 300 mg/dL).      •You vomit blood.      These symptoms may be an emergency. Do not wait to see if the symptoms will go away. Get help right away. Call 911.       Summary    •Acute pancreatitis happens when there is sudden swelling and irritation of the pancreas.      •This condition is often caused by heavy alcohol use, drug use, or gallstones.      •You will likely have to stay in the hospital for treatment.      This information is not intended to replace advice given to you by your health care provider. Make sure you discuss any questions you have with your health care provider.

## 2023-02-17 NOTE — ED ADULT NURSE NOTE - NSIMPLEMENTINTERV_GEN_ALL_ED
Implemented All Universal Safety Interventions:  [place] to call system. Call bell, personal items and telephone within reach. Instruct patient to call for assistance. Room bathroom lighting operational. Non-slip footwear when patient is off stretcher. Physically safe environment: no spills, clutter or unnecessary equipment. Stretcher in lowest position, wheels locked, appropriate side rails in place.
Viral syndrome

## 2023-02-17 NOTE — ED ADULT TRIAGE NOTE - CHIEF COMPLAINT QUOTE
" I have abdominal pain on my left side since past Tuesday " Pt denies vomiting or diarrhea  (+) Fever at night

## 2023-02-17 NOTE — ED PROVIDER NOTE - CLINICAL SUMMARY MEDICAL DECISION MAKING FREE TEXT BOX
Patient is a 77-year-old gentleman who presents to the emergency room with a chief complaint of abdominal pain.  Close medical history of GERD.   Patient reports that he has been experiencing left-sided abdominal pain for the last 3 days.  He also endorses that he noted a low-grade fever mostly at night with a Tmax of 100.4.  He took Tylenol on day 1 but has not taken anything since.  Does endorse that he had a colonoscopy last year which showed diverticulosis but has never had an episode of diverticulitis.  He reports that the pain is mostly on the left side of the abdomen sometimes in the upper abdominal region sometimes in the lower abdominal region and almost feels like a gas pocket.  He reports when he presses on his stomach he notes belching and that relieves some of the pain.  He does endorse that the pain has progressively improved over the last several days.  Denies any prior similar episodes.  Denies any chills.  Reports nausea but denies any vomiting or diarrhea.  Denies chest pain or shortness of breath.  On exam patient is lying in bed appears comfortable at this time normocephalic atraumatic pupils are equal round and reactive heart is regular rate lungs are clear to auscultation abdomen is soft tenderness to palpation to the left lower quadrant and left upper quadrant.  Patient does have hyperactive bowel sounds and does belch and the abdomen is pressed.  There is no guarding or rebound noted.  No skin changes noted.  Patient presenting to the emergency room with left-sided abdominal pain.  Differential includes but not limited to possible pancreatitis versus diverticulitis versus colitis versus additional intra-abdominal pathology.  Will obtain screening labs check UA urine culture obtain CT imaging of the abdomen pelvis hydrate and medicate as needed for symptoms.  Results of labs reviewed patient with a mildly elevated white count no significant electrolyte abnormality UA does not appear to be infected CT abdomen pelvis reveals acute interstitial edematous pancreatitis of the pancreatic tail.  GI was consulted and patient was seen at bedside.  Patient can be discharged home with close outpatient follow-up.

## 2023-02-17 NOTE — ED PROVIDER NOTE - OBJECTIVE STATEMENT
77-year-old male with history of GERD presents with left-sided abdominal pain for the past 3 days.  Pain mild in nature.  Denies any nausea, vomiting, diarrhea, or constipation.  Patient reports low-grade fevers at night around 100.4.  Took Tylenol the first day but has not taken anything since then and fever continues to resolve.  Denies any urinary complaints.  Denies history of similar symptoms in the past.  No previous abdominal surgeries.  Does report colonoscopy last year and was told he has mild case of diverticulosis  PCP Kelvin Alan 77-year-old male with history of GERD presents with left-sided abdominal pain for the past 3 days.  Pain mild in nature.  Denies any nausea, vomiting, diarrhea, or constipation.  Patient reports low-grade fevers at night around 100.4.  Took Tylenol the first day but has not taken anything since then and fever continues to resolve.  Denies any urinary complaints.  Denies history of similar symptoms in the past.  No previous abdominal surgeries.  Does report colonoscopy last year and was told he has mild case of diverticulosis. also reports increased "gas". feels like "he releases gas when he pushes on his abdomen"  PCP Kelvin Alan

## 2023-02-17 NOTE — ED ADULT NURSE NOTE - OBJECTIVE STATEMENT
Patient reports left lower abdominal pain since tuesday. Patient reports "gassy pain", and when he presses his abdomen he belches. Patient denies nausea, vomiting, diarrhea.

## 2023-02-17 NOTE — CONSULT NOTE ADULT - ASSESSMENT
acute pancrratitis  ? gallstone    plan  check imaging us/ct scan  will get labs and mri as an outpt  d/w patient and wiofe  will need to follow a low fat diet  pain management  ivf aggressively  ppi once a day  serial lft and amylase and lipase and r/o other causes of pancreatitis autoimmune  surgical consultation may need gb removed    Advanced care planning was discussed with patient and family.  Advanced care planning forms were reviewed and discussed.  Risks, benefits and alternatives of gastroenterologic procedures were discussed in detail and all questions were answered.    30 minutes spent.

## 2023-02-17 NOTE — ED ADULT NURSE NOTE - IN THE PAST 12 MONTHS HAVE YOU USED DRUGS OTHER THAN THOSE REQUIRED FOR MEDICAL REASON?
Chest Tube Insertion Procedure Note    Indications:  Clinically significant left spontaneous pneumothorax    Pre-operative Diagnosis: Left spontaneous pneumothorax    Post-operative Diagnosis: Left spontaneous pneumothorax    Procedure Details   Informed consent was obtained for the procedure, including sedation.  Risks of lung perforation, hemorrhage, arrhythmia, and adverse drug reaction were discussed.     After sterile skin prep, using standard technique, a 10 [place] tube was placed in the left anterior 3rd rib space inferolateral to the pacemaker.    Findings:  Placement of 10 [place] Aaron pigtail catheter    Estimated Blood Loss:  Minimal           Specimens:  None                Complications:  None; patient tolerated the procedure well.           Disposition: ICU - extubated and stable.           Condition: stable    Attending Attestation: I was present and scrubbed for the entire procedure.  
No

## 2023-02-17 NOTE — CONSULT NOTE ADULT - SUBJECTIVE AND OBJECTIVE BOX
Chief Complaint:  Patient is a 77y old  Male who presents with a chief complaint of     HPI: abd pain started on tuesday nght denies any n/v/d he has had some oily food and he has had some steaks he has had pancreatitis  seen on ct scan labs are normal denies any etoh abuse as well.   · Chief Complaint: The patient is a 77y Male complaining of abdominal pain.  · HPI Objective Statement: 77-year-old male with history of GERD presents with left-sided abdominal pain for the past 3 days.  Pain mild in nature.  Denies any nausea, vomiting, diarrhea, or constipation.  Patient reports low-grade fevers at night around 100.4.  Took Tylenol the first day but has not taken anything since then and fever continues to resolve.  Denies any urinary complaints.  Denies history of similar symptoms in the past.  No previous abdominal surgeries.  Does report colonoscopy last year and was told he has mild case of diverticulosis. also reports increased "gas". feels like "he releases gas when he pushes on his abdomen"  	PCP Kelvin Alan    · Presenting Symptoms: PAIN  · Negative Findings: no nausea, no vomiting  · Location: abdomen  · Laterality: left  · Area: lower  · Radiation: no radiation  · Timing: gradual onset  · Duration: day(s)  3  · Quality: aching  · Severity: MILD  · Context: unknown  · Recent Exposure To: none known  · Aggravated Factors: none  · Relieving Factors: none    Allergies:  No Known Allergies      Medications:      PMHX/PSHX:  GERD (gastroesophageal reflux disease)        Family history:    no pancreatic cancer    Social History:   n0o etoh no cigs   has had a colonosopcy ion the past    ROS:     General:  No wt loss, fevers, chills, night sweats, fatigue,   Eyes:  Good vision, no reported pain  ENT:  No sore throat, pain, runny nose, dysphagia  CV:  No pain, palpitations, hypo/hypertension  Resp:  No dyspnea, cough, tachypnea, wheezing  GI:  No pain, No nausea, No vomiting, No diarrhea, No constipation, No weight loss, No fever, No pruritis, No rectal bleeding, No tarry stools, No dysphagia,  :  No pain, bleeding, incontinence, nocturia  Muscle:  No pain, weakness  Neuro:  No weakness, tingling, memory problems  Psych:  No fatigue, insomnia, mood problems, depression  Endocrine:  No polyuria, polydipsia, cold/heat intolerance  Heme:  No petechiae, ecchymosis, easy bruisability  Skin:  No rash, tattoos, scars, edema      PHYSICAL EXAM:   Vital Signs:  Vital Signs Last 24 Hrs  T(C): 36.7 (2023 16:37), Max: 36.9 (2023 14:22)  T(F): 98.1 (2023 16:37), Max: 98.5 (2023 14:22)  HR: 90 (2023 18:12) (90 - 102)  BP: 160/75 (2023 18:12) (150/90 - 160/75)  BP(mean): --  RR: 15 (2023 18:12) (15 - 18)  SpO2: 98% (2023 18:12) (96% - 98%)    Parameters below as of 2023 18:12  Patient On (Oxygen Delivery Method): room air      Daily Height in cm: 170.18 (2023 14:22)    Daily     GENERAL:  Appears stated age, well-groomed, well-nourished, no distress  HEENT:  NC/AT,  conjunctivae clear and pink, no thyromegaly, nodules, adenopathy, no JVD, sclera -anicteric  CHEST:  Full & symmetric excursion, no increased effort, breath sounds clear  HEART:  Regular rhythm, S1, S2, no murmur/rub/S3/S4, no abdominal bruit, no edema  ABDOMEN:  Soft, non-tender, non-distended, normoactive bowel sounds,  no masses ,no hepato-splenomegaly, no signs of chronic liver disease  EXTEREMITIES:  no cyanosis,clubbing or edema  SKIN:  No rash/erythema/ecchymoses/petechiae/wounds/abscess/warm/dry  NEURO:  Alert, oriented, no asterixis, no tremor, no encephalopathy    LABS:                        12.9   12.70 )-----------( 222      ( 2023 14:57 )             40.8     02-17    139  |  101  |  17  ----------------------------<  117<H>  3.5   |  29  |  1.37<H>    Ca    10.0      2023 14:57    TPro  7.9  /  Alb  3.4  /  TBili  0.9  /  DBili  x   /  AST  18  /  ALT  17  /  AlkPhos  72  02-17    LIVER FUNCTIONS - ( 2023 14:57 )  Alb: 3.4 g/dL / Pro: 7.9 g/dL / ALK PHOS: 72 U/L / ALT: 17 U/L DA / AST: 18 U/L / GGT: x             Urinalysis Basic - ( 2023 15:11 )    Color: Yellow / Appearance: Clear / S.025 / pH: x  Gluc: x / Ketone: Trace  / Bili: Negative / Urobili: 1 mg/dL   Blood: x / Protein: 100 mg/dL / Nitrite: Negative   Leuk Esterase: Trace / RBC: 3-5 /HPF / WBC 3-5   Sq Epi: x / Non Sq Epi: Occasional / Bacteria: Occasional      Amylase Serum--      Lipase [id number]       Ammonia--      Imaging:

## 2023-02-17 NOTE — ED ADULT TRIAGE NOTE - GLASGOW COMA SCALE: SCORE, MLM
15 Tetracycline Counseling: Patient counseled regarding possible photosensitivity and increased risk for sunburn.  Patient instructed to avoid sunlight, if possible.  When exposed to sunlight, patients should wear protective clothing, sunglasses, and sunscreen.  The patient was instructed to call the office immediately if the following severe adverse effects occur:  hearing changes, easy bruising/bleeding, severe headache, or vision changes.  The patient verbalized understanding of the proper use and possible adverse effects of tetracycline.  All of the patient's questions and concerns were addressed. Patient understands to avoid pregnancy while on therapy due to potential birth defects.

## 2023-02-21 PROBLEM — K21.9 GASTRO-ESOPHAGEAL REFLUX DISEASE WITHOUT ESOPHAGITIS: Chronic | Status: ACTIVE | Noted: 2023-02-17

## 2023-02-28 ENCOUNTER — APPOINTMENT (OUTPATIENT)
Dept: MRI IMAGING | Facility: CLINIC | Age: 78
End: 2023-02-28
Payer: MEDICARE

## 2023-02-28 ENCOUNTER — OUTPATIENT (OUTPATIENT)
Dept: OUTPATIENT SERVICES | Facility: HOSPITAL | Age: 78
LOS: 1 days | End: 2023-02-28
Payer: MEDICARE

## 2023-02-28 DIAGNOSIS — Z00.8 ENCOUNTER FOR OTHER GENERAL EXAMINATION: ICD-10-CM

## 2023-02-28 PROCEDURE — 74183 MRI ABD W/O CNTR FLWD CNTR: CPT

## 2023-02-28 PROCEDURE — 74183 MRI ABD W/O CNTR FLWD CNTR: CPT | Mod: 26

## 2023-02-28 PROCEDURE — A9585: CPT

## 2023-02-28 PROCEDURE — 72197 MRI PELVIS W/O & W/DYE: CPT

## 2023-02-28 PROCEDURE — 72197 MRI PELVIS W/O & W/DYE: CPT | Mod: 26

## 2023-03-14 ENCOUNTER — APPOINTMENT (OUTPATIENT)
Dept: SURGERY | Facility: CLINIC | Age: 78
End: 2023-03-14
Payer: MEDICARE

## 2023-03-14 VITALS
SYSTOLIC BLOOD PRESSURE: 144 MMHG | HEIGHT: 67 IN | OXYGEN SATURATION: 97 % | RESPIRATION RATE: 17 BRPM | DIASTOLIC BLOOD PRESSURE: 76 MMHG | TEMPERATURE: 96.8 F | BODY MASS INDEX: 28.88 KG/M2 | HEART RATE: 95 BPM | WEIGHT: 184 LBS

## 2023-03-14 DIAGNOSIS — K85.90 ACUTE PANCREATITIS WITHOUT NECROSIS OR INFECTION, UNSPECIFIED: ICD-10-CM

## 2023-03-14 DIAGNOSIS — Z85.46 PERSONAL HISTORY OF MALIGNANT NEOPLASM OF PROSTATE: ICD-10-CM

## 2023-03-14 PROCEDURE — 99203 OFFICE O/P NEW LOW 30 MIN: CPT

## 2023-03-14 RX ORDER — METHYLPREDNISOLONE 4 MG/1
4 TABLET ORAL
Qty: 1 | Refills: 1 | Status: DISCONTINUED | COMMUNITY
Start: 2022-11-01 | End: 2023-03-14

## 2023-03-14 RX ORDER — MELOXICAM 15 MG/1
15 TABLET ORAL
Qty: 30 | Refills: 1 | Status: DISCONTINUED | COMMUNITY
Start: 2022-08-31 | End: 2023-03-14

## 2023-03-14 NOTE — PHYSICAL EXAM
[Normal Breath Sounds] : Normal breath sounds [Normal Heart Sounds] : normal heart sounds [No Rash or Lesion] : No rash or lesion [Alert] : alert [Oriented to Person] : oriented to person [Oriented to Place] : oriented to place [Oriented to Time] : oriented to time [Calm] : calm [Abdominal Masses] : No abdominal masses [Abdomen Tenderness] : ~T ~M No abdominal tenderness [No HSM] : no hepatosplenomegaly [de-identified] : WNL [de-identified] : WNL [de-identified] : REALL [de-identified] : WNL ROM [de-identified] : WNL no jaundice or scleral icterus

## 2023-03-14 NOTE — CONSULT LETTER
[Dear  ___] : Dear  [unfilled], [Consult Letter:] : I had the pleasure of evaluating your patient, [unfilled]. [Please see my note below.] : Please see my note below. [Consult Closing:] : Thank you very much for allowing me to participate in the care of this patient.  If you have any questions, please do not hesitate to contact me. [Sincerely,] : Sincerely, [FreeTextEntry3] : Leeroy Heart M.D., F.A.C.S, F.A.S.C.R.S [DrWilbur  ___] : Dr. PAZ

## 2023-03-14 NOTE — ASSESSMENT
Spoke to caregiver, Tonia, scheduled patient for 10-   [FreeTextEntry1] : In summary the patient had an episode of mild pancreatitis of the tail of the pancreas a month ago.  His symptoms have now completely resolved.  MRI performed recently demonstrates improvement of his pancreatitis with only mild residual inflammatory changes around the tail of the pancreas and no evidence of pancreatic mass..  There are gallstones with no choledocholithiasis or dilated bile duct.  He does not drink alcohol and likely had an episode of  gallstone pancreatitis.  I therefore recommended he undergo a laparoscopic cholecystectomy.  I explained the risks benefits and alternatives including the rare complications of bleeding infection the possible need for an open procedure, rare incidence of bile duct injury and retained common duct stones.  He will also follow-up with Dr. Alan for a surveillance MRI in 2 months.

## 2023-03-14 NOTE — HISTORY OF PRESENT ILLNESS
[de-identified] : Polo is a 78 y/o male here for a consultation visit, gallbladder. \par \par CT A+P on 2/17/23 - Cholelithiasis. 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 No evidence of necrosis, duct dilation, or discrete mass. Follow-up to complete is recommended. Gallstones without biliary dilatation or choledocholithiasis. \par \par Labs on 2/17/23 - Bilirubin: 0.9, ALP: 72, AST: 18, ALT: 17. \par \par Went to ED on 2/17/23 for abdominal pain. \par \par Hx of prostate cancer, RT ended in 2019. \par \par Today pt reports no pain. Has been having left abdominal pain that radiates to epigastric area randomly starting 2/14/23 that’s has improved - now has some abdominal discomfort when having BM.  Was having multiple BMs on 2/14 and then was constipated for about 3 days after - has now resolved. Did have some nausea on 2/14 - 2/17 but no vomiting (has now resolved). Did have fever (100F to high 90sF) from 2/14 - 2/17 and chills but has now resolved. Fair appetite. Not taking any anticoagulants.  No prior abdominal surgery\par

## 2023-03-27 ENCOUNTER — OUTPATIENT (OUTPATIENT)
Dept: OUTPATIENT SERVICES | Facility: HOSPITAL | Age: 78
LOS: 1 days | End: 2023-03-27
Payer: MEDICARE

## 2023-03-27 VITALS
WEIGHT: 187.61 LBS | HEIGHT: 67 IN | TEMPERATURE: 98 F | DIASTOLIC BLOOD PRESSURE: 76 MMHG | RESPIRATION RATE: 16 BRPM | OXYGEN SATURATION: 100 % | SYSTOLIC BLOOD PRESSURE: 163 MMHG | HEART RATE: 100 BPM

## 2023-03-27 DIAGNOSIS — Z90.89 ACQUIRED ABSENCE OF OTHER ORGANS: Chronic | ICD-10-CM

## 2023-03-27 DIAGNOSIS — Z98.890 OTHER SPECIFIED POSTPROCEDURAL STATES: Chronic | ICD-10-CM

## 2023-03-27 DIAGNOSIS — Z01.818 ENCOUNTER FOR OTHER PREPROCEDURAL EXAMINATION: ICD-10-CM

## 2023-03-27 DIAGNOSIS — K85.10 BILIARY ACUTE PANCREATITIS WITHOUT NECROSIS OR INFECTION: ICD-10-CM

## 2023-03-27 PROCEDURE — G0463: CPT

## 2023-03-27 PROCEDURE — 80053 COMPREHEN METABOLIC PANEL: CPT

## 2023-03-27 PROCEDURE — 36415 COLL VENOUS BLD VENIPUNCTURE: CPT

## 2023-03-27 PROCEDURE — 85027 COMPLETE CBC AUTOMATED: CPT

## 2023-03-27 RX ORDER — SODIUM CHLORIDE 9 MG/ML
1000 INJECTION, SOLUTION INTRAVENOUS
Refills: 0 | Status: DISCONTINUED | OUTPATIENT
Start: 2023-04-13 | End: 2023-04-27

## 2023-03-27 RX ORDER — LIDOCAINE HCL 20 MG/ML
0.2 VIAL (ML) INJECTION ONCE
Refills: 0 | Status: DISCONTINUED | OUTPATIENT
Start: 2023-04-13 | End: 2023-04-27

## 2023-03-27 NOTE — H&P PST ADULT - ASSESSMENT
DASI: walk daily lift grandchild Mets 7  Symptoms : Denies SOB, MILTON, palpitations  Airway : no airway abnormalities , denies prior anesthesia complications   Mallampati : 3  Denies loose teeth    Corneal abrasion risk : Denies

## 2023-03-27 NOTE — H&P PST ADULT - NSICDXPASTMEDICALHX_GEN_ALL_CORE_FT
PAST MEDICAL HISTORY:  Chronic back pain     Disc displacement, lumbar     GERD (gastroesophageal reflux disease)     H/O acute cholecystitis     H/O acute pancreatitis     Lipoma      PAST MEDICAL HISTORY:  Chronic back pain     Disc displacement, lumbar     ED (erectile dysfunction)     GERD (gastroesophageal reflux disease)     H/O acute cholecystitis     H/O acute pancreatitis     Lipoma     Prostate cancer

## 2023-03-27 NOTE — H&P PST ADULT - NSANTHOSAYNRD_GEN_A_CORE
walk 1 mile 2 x week can lift 3 year old grandchild/No. KENDALL screening performed.  STOP BANG Legend: 0-2 = LOW Risk; 3-4 = INTERMEDIATE Risk; 5-8 = HIGH Risk

## 2023-03-27 NOTE — H&P PST ADULT - NSICDXPASTSURGICALHX_GEN_ALL_CORE_FT
PAST SURGICAL HISTORY:  S/P excision of lipoma     S/P tonsillectomy      PAST SURGICAL HISTORY:  H/O prostate biopsy     S/P excision of lipoma     S/P tonsillectomy

## 2023-03-27 NOTE — H&P PST ADULT - HISTORY OF PRESENT ILLNESS
had flank / abdominal pain  went to ER work up gallstones and pancreatitis now for lap elan 77 yr old male with hx of Prostate  Cancer s/p RT and medication.  Had  flank / abdominal pain  went to ER work up gallstones and pancreatitis  now for lap cholecystectomydenies acute s/s     *COVID  denies foreign travel  denies s/s   denies known exposure  denies infection

## 2023-03-29 ENCOUNTER — APPOINTMENT (OUTPATIENT)
Dept: INTERNAL MEDICINE | Facility: CLINIC | Age: 78
End: 2023-03-29

## 2023-03-29 ENCOUNTER — RESULT CHARGE (OUTPATIENT)
Age: 78
End: 2023-03-29

## 2023-03-30 ENCOUNTER — NON-APPOINTMENT (OUTPATIENT)
Age: 78
End: 2023-03-30

## 2023-03-30 ENCOUNTER — APPOINTMENT (OUTPATIENT)
Dept: INTERNAL MEDICINE | Facility: CLINIC | Age: 78
End: 2023-03-30
Payer: MEDICARE

## 2023-03-30 VITALS
BODY MASS INDEX: 28.25 KG/M2 | HEART RATE: 115 BPM | HEIGHT: 67 IN | DIASTOLIC BLOOD PRESSURE: 66 MMHG | RESPIRATION RATE: 17 BRPM | SYSTOLIC BLOOD PRESSURE: 118 MMHG | OXYGEN SATURATION: 98 % | WEIGHT: 180 LBS | TEMPERATURE: 98 F

## 2023-03-30 DIAGNOSIS — K80.20 CALCULUS OF GALLBLADDER W/OUT CHOLECYSTITIS W/OUT OBSTRUCTION: ICD-10-CM

## 2023-03-30 DIAGNOSIS — M75.51 BURSITIS OF RIGHT SHOULDER: ICD-10-CM

## 2023-03-30 DIAGNOSIS — Z01.818 ENCOUNTER FOR OTHER PREPROCEDURAL EXAMINATION: ICD-10-CM

## 2023-03-30 DIAGNOSIS — Z20.822 CONTACT WITH AND (SUSPECTED) EXPOSURE TO COVID-19: ICD-10-CM

## 2023-03-30 DIAGNOSIS — K85.10 BILIARY ACUTE PANCREATITIS WITHOUT NECROSIS OR INFECTION: ICD-10-CM

## 2023-03-30 DIAGNOSIS — S43.421A SPRAIN OF RIGHT ROTATOR CUFF CAPSULE, INITIAL ENCOUNTER: ICD-10-CM

## 2023-03-30 PROCEDURE — 99214 OFFICE O/P EST MOD 30 MIN: CPT | Mod: 25

## 2023-03-30 PROCEDURE — 93000 ELECTROCARDIOGRAM COMPLETE: CPT | Mod: 59

## 2023-03-30 NOTE — RESULTS/DATA
[] : results reviewed [de-identified] :  White blood cell count 6.94, hemoglobin 13.2, hematocrit 42.8, platelet count 193 [de-identified] :  sodium level 140, potassium 3.7, chloride 103, carbon dioxide 25, BUN 19, creatinine 1.15, glucose 105 (high), calcium 9.8, total protein 7.4, alk phos 79, AST 17, ALT 15, GFR 66, [de-identified] : EKG Tachycardia with no acute changes from prior EKG in 2019  besides rate

## 2023-03-30 NOTE — HISTORY OF PRESENT ILLNESS
[No Pertinent Cardiac History] : no history of aortic stenosis, atrial fibrillation, coronary artery disease, recent myocardial infarction, or implantable device/pacemaker [No Pertinent Pulmonary History] : no history of asthma, COPD, sleep apnea, or smoking [No Adverse Anesthesia Reaction] : no adverse anesthesia reaction in self or family member [(Patient denies any chest pain, claudication, dyspnea on exertion, orthopnea, palpitations or syncope)] : Patient denies any chest pain, claudication, dyspnea on exertion, orthopnea, palpitations or syncope [Aortic Stenosis] : no aortic stenosis [Atrial Fibrillation] : no atrial fibrillation [Recent Myocardial Infarction] : no recent myocardial infarction [Implantable Device/Pacemaker] : no implantable device/pacemaker [Asthma] : no asthma [COPD] : no COPD [Sleep Apnea] : no sleep apnea [Smoker] : not a smoker [Family Member] : no family member with adverse anesthesia reaction/sudden death [Self] : no previous adverse anesthesia reaction [Chronic Anticoagulation] : no chronic anticoagulation [Chronic Kidney Disease] : no chronic kidney disease [Diabetes] : no diabetes [FreeTextEntry1] : Laparoscopic cholecystectomy [FreeTextEntry2] : 04/13/23 [FreeTextEntry3] : Dr. Leeroy Heart  [FreeTextEntry4] : Mr. CORDERO is a 77 year  male, with a past medical history as noted below,who present to the office  today for Medical clearance.\par  patient is scheduled for laparoscopic cholecystectomy status post gallstone induced pancreatitis.   Patient states feeling much better abdominal pain has resolved denies any recent fever chills or night sweats.  Denies having chest pain, shortness of breath, dyspnea on exertion\par \par

## 2023-03-30 NOTE — ASSESSMENT
[Patient Optimized for Surgery] : Patient optimized for surgery [FreeTextEntry4] : The patient is 77  year male who is a low risk surgical candidate with good functional capacity going for a low-intermediate risk surgical procedure.\par

## 2023-03-30 NOTE — PHYSICAL EXAM
[No Acute Distress] : no acute distress [Well Nourished] : well nourished [Well Developed] : well developed [Well-Appearing] : well-appearing [Normal Sclera/Conjunctiva] : normal sclera/conjunctiva [PERRL] : pupils equal round and reactive to light [EOMI] : extraocular movements intact [Normal Outer Ear/Nose] : the outer ears and nose were normal in appearance [Normal Oropharynx] : the oropharynx was normal [No JVD] : no jugular venous distention [Supple] : supple [No Lymphadenopathy] : no lymphadenopathy [Thyroid Normal, No Nodules] : the thyroid was normal and there were no nodules present [No Respiratory Distress] : no respiratory distress  [Clear to Auscultation] : lungs were clear to auscultation bilaterally [No Accessory Muscle Use] : no accessory muscle use [Normal Rate] : normal rate  [Regular Rhythm] : with a regular rhythm [Normal S1, S2] : normal S1 and S2 [No Carotid Bruits] : no carotid bruits [No Abdominal Bruit] : a ~M bruit was not heard ~T in the abdomen [Pedal Pulses Present] : the pedal pulses are present [No Edema] : there was no peripheral edema [No Extremity Clubbing/Cyanosis] : no extremity clubbing/cyanosis [No Palpable Aorta] : no palpable aorta [Soft] : abdomen soft [Non-distended] : non-distended [No Masses] : no abdominal mass palpated [No HSM] : no HSM [Normal Bowel Sounds] : normal bowel sounds [Normal Posterior Cervical Nodes] : no posterior cervical lymphadenopathy [Normal Anterior Cervical Nodes] : no anterior cervical lymphadenopathy [No CVA Tenderness] : no CVA  tenderness [No Spinal Tenderness] : no spinal tenderness [No Joint Swelling] : no joint swelling [Grossly Normal Strength/Tone] : grossly normal strength/tone [No Rash] : no rash [Normal Gait] : normal gait [Coordination Grossly Intact] : coordination grossly intact [No Focal Deficits] : no focal deficits [Deep Tendon Reflexes (DTR)] : deep tendon reflexes were 2+ and symmetric [Normal Affect] : the affect was normal [Normal Insight/Judgement] : insight and judgment were intact [Normal TMs] : both tympanic membranes were normal [Normal Appearance] : normal in appearance [Speech Grossly Normal] : speech grossly normal [Alert and Oriented x3] : oriented to person, place, and time [Normal Mood] : the mood was normal [de-identified] : obese,  slight tenderness noticed right upper quadrant no rebound no guarding appreciated [de-identified] :  discoloration noted left lower extremity

## 2023-03-30 NOTE — PLAN
[FreeTextEntry1] : Patient history, physical and ancillary testing was reviewed by  Dr. Kelvin Pimentel. \par BRYAN MORICI  was advised not to take any NSAIDs, ASA, Vitamin E, omega 3, ginkgo biloba or MVI 7 days prior to the surgery. \par \par Patient was advised not to take any medication the day of the procedure.\par Bryan is advised not to eat after midnight the night prior to surgical procedure\par \par Patient in optimal condition for proposed procedure and anesthesia\par \par

## 2023-03-30 NOTE — REVIEW OF SYSTEMS
[Negative] : Heme/Lymph [Nocturia] : nocturia [Back Pain] : back pain [Fever] : no fever [Chills] : no chills [Fatigue] : no fatigue [Night Sweats] : no night sweats [Discharge] : no discharge [Pain] : no pain [Vision Problems] : no vision problems [Earache] : no earache [Nosebleeds] : no nosebleeds [Chest Pain] : no chest pain [Palpitations] : no palpitations [Lower Ext Edema] : no lower extremity edema [Orthopena] : no orthopnea [Shortness Of Breath] : no shortness of breath [Wheezing] : no wheezing [Cough] : no cough [Dyspnea on Exertion] : not dyspnea on exertion [Abdominal Pain] : no abdominal pain [Nausea] : no nausea [Constipation] : no constipation [Diarrhea] : no diarrhea [Vomiting] : no vomiting [Heartburn] : no heartburn [Melena] : no melena [Dysuria] : no dysuria [Hematuria] : no hematuria [Itching] : no itching [Skin Rash] : no skin rash [Headache] : no headache [Dizziness] : no dizziness [Fainting] : no fainting [Confusion] : no confusion [Depression] : no depression [Easy Bleeding] : no easy bleeding [Easy Bruising] : no easy bruising [Swollen Glands] : no swollen glands [FreeTextEntry3] : starting with cataracts  [FreeTextEntry8] : nocturia x 1  [FreeTextEntry9] : sciatica

## 2023-04-03 ENCOUNTER — APPOINTMENT (OUTPATIENT)
Dept: ORTHOPEDIC SURGERY | Facility: CLINIC | Age: 78
End: 2023-04-03
Payer: MEDICARE

## 2023-04-03 VITALS — WEIGHT: 180 LBS | HEIGHT: 67 IN | BODY MASS INDEX: 28.25 KG/M2

## 2023-04-03 PROCEDURE — 72100 X-RAY EXAM L-S SPINE 2/3 VWS: CPT

## 2023-04-03 PROCEDURE — 99213 OFFICE O/P EST LOW 20 MIN: CPT

## 2023-04-03 NOTE — HISTORY OF PRESENT ILLNESS
[de-identified] : 04/03/23:  Return visit for this 77 year male c/o recurring LBP x last 2 months duration. Has been doing PT 1x/week x last 2 months w/o relief. Worse getting OOB in am and arising from a sitting position. Localized to LB and radiates to both buttocks. Taking tylenol prn w/o relief. \par PMH: had a gallbladder attack around 2/14/23. Went to ER and was sent home. Now scheduled for gallbladder excision on 4/13/23.\par \par 11/01/22:  Returns today two weeks after right shoulder cortisone injection. Feeling about 63 yo 70% better after injection.  Could not continue meloxicam due to GERD.  In PT for his shoulder 1-2x/week  and feels it helps.\par \par Also complaining of a flare-up of lower back pain.  No leg pain. His back pain improved after PT and then resolved with the summer weather, but is worsening again over the last month after the weather has become cooler and after playing with his 2 year old granddaughter.  Not taking any medication for pain.  Heating pad once in a while.  A hot shower helps.\par \par 10/14/22:  Patient is here today for his right shoulder.  Had seen Dr. Anguiano two months ago who prescribed meloxicam and PT.  He did have an MRI.   States he had to stop the meloxicam after five days due to GERD and stomach upset.  Is doing a little better, but still with pain. \par \par Impression: \par 1. High-grade partial tearing and delamination of the supraspinatus tendon insertion with moderate surrounding bursitis without retraction or atrophy.\par 2. Superior labral tearing, infraspinatus and subscapularis tendinopathy and biceps tenosynovitis with mild effusion, capsulitis, bursitis and AC joint arthrosis.\par 3. No acute fracture or disproportionate muscle atrophy.\par \par 9/7/22: MRI review of the right shoulder. \par \par This is a 75 YO male RHD, with right shoulder pain that gradually onset of yanking a lawnmower. +Motrin use. Some night symptoms. C/o of nagging pulling pain, into trap and triceps. Denies n/t. Denies prior history. \par \par 10/19/21: Return visit for a 75 year old male here today for acute onset of LBP x 1 week ago. Localized to LB. No leg pain. Constant and daily. Went for acupuncture x 2 visits w/ slight relief only. Wearing lumbar support. Worse in am getting OOB. No wake up pain at night. pain is a "5-6". Tried aleve 2 pills prn w/o relief.\par PMH: had acute LBP x 12 years ago after lifting a heavy tree trunk. has had recurring episodes 1-2x/year [FreeTextEntry1] : low back

## 2023-04-03 NOTE — PHYSICAL EXAM
[Normal Mood and Affect] : normal mood and affect [Orientated] : orientated [Able to Communicate] : able to communicate [Normal Skin] : normal skin [Well Developed] : well developed [Well Nourished] : well nourished [NL (30)] : right lateral bending 30 degrees [Flexion] : flexion [Disc space narrowing] : Disc space narrowing [Spondylolithesis] : Spondylolithesis [] : non-antalgic [FreeTextEntry1] : Has grade 1 spondylo at L5-S1 with pars defect L5.DDD at L2-L3 [TWNoteComboBox7] : forward flexion 75 degrees [FreeTextEntry8] : SA tenderness [de-identified] : Mild +Spencer

## 2023-04-12 ENCOUNTER — TRANSCRIPTION ENCOUNTER (OUTPATIENT)
Age: 78
End: 2023-04-12

## 2023-04-13 ENCOUNTER — RESULT REVIEW (OUTPATIENT)
Age: 78
End: 2023-04-13

## 2023-04-13 ENCOUNTER — TRANSCRIPTION ENCOUNTER (OUTPATIENT)
Age: 78
End: 2023-04-13

## 2023-04-13 ENCOUNTER — APPOINTMENT (OUTPATIENT)
Dept: SURGERY | Facility: HOSPITAL | Age: 78
End: 2023-04-13
Payer: MEDICARE

## 2023-04-13 ENCOUNTER — OUTPATIENT (OUTPATIENT)
Dept: OUTPATIENT SERVICES | Facility: HOSPITAL | Age: 78
LOS: 1 days | End: 2023-04-13
Payer: MEDICARE

## 2023-04-13 VITALS
HEART RATE: 90 BPM | SYSTOLIC BLOOD PRESSURE: 146 MMHG | RESPIRATION RATE: 16 BRPM | DIASTOLIC BLOOD PRESSURE: 75 MMHG | TEMPERATURE: 97 F | OXYGEN SATURATION: 99 %

## 2023-04-13 VITALS
HEART RATE: 84 BPM | WEIGHT: 187.61 LBS | SYSTOLIC BLOOD PRESSURE: 133 MMHG | TEMPERATURE: 98 F | RESPIRATION RATE: 16 BRPM | HEIGHT: 67 IN | DIASTOLIC BLOOD PRESSURE: 78 MMHG | OXYGEN SATURATION: 99 %

## 2023-04-13 DIAGNOSIS — K85.10 BILIARY ACUTE PANCREATITIS WITHOUT NECROSIS OR INFECTION: ICD-10-CM

## 2023-04-13 DIAGNOSIS — Z98.890 OTHER SPECIFIED POSTPROCEDURAL STATES: Chronic | ICD-10-CM

## 2023-04-13 DIAGNOSIS — Z90.89 ACQUIRED ABSENCE OF OTHER ORGANS: Chronic | ICD-10-CM

## 2023-04-13 PROCEDURE — 47562 LAPAROSCOPIC CHOLECYSTECTOMY: CPT

## 2023-04-13 PROCEDURE — C1889: CPT

## 2023-04-13 PROCEDURE — 88304 TISSUE EXAM BY PATHOLOGIST: CPT

## 2023-04-13 PROCEDURE — C9399: CPT

## 2023-04-13 PROCEDURE — 88304 TISSUE EXAM BY PATHOLOGIST: CPT | Mod: 26

## 2023-04-13 DEVICE — LIGATING CLIPS WECK HEMOLOK POLYMER MEDIUM-LARGE (GREEN) 6: Type: IMPLANTABLE DEVICE | Status: FUNCTIONAL

## 2023-04-13 RX ORDER — CEFAZOLIN SODIUM 1 G
2000 VIAL (EA) INJECTION ONCE
Refills: 0 | Status: COMPLETED | OUTPATIENT
Start: 2023-04-13 | End: 2023-04-13

## 2023-04-13 RX ORDER — HYDROMORPHONE HYDROCHLORIDE 2 MG/ML
0.25 INJECTION INTRAMUSCULAR; INTRAVENOUS; SUBCUTANEOUS
Refills: 0 | Status: DISCONTINUED | OUTPATIENT
Start: 2023-04-13 | End: 2023-04-13

## 2023-04-13 RX ORDER — PANTOPRAZOLE SODIUM 20 MG/1
1 TABLET, DELAYED RELEASE ORAL
Refills: 0 | DISCHARGE

## 2023-04-13 RX ORDER — OXYCODONE HYDROCHLORIDE 5 MG/1
5 TABLET ORAL ONCE
Refills: 0 | Status: DISCONTINUED | OUTPATIENT
Start: 2023-04-13 | End: 2023-04-13

## 2023-04-13 RX ORDER — SODIUM CHLORIDE 9 MG/ML
1000 INJECTION, SOLUTION INTRAVENOUS
Refills: 0 | Status: DISCONTINUED | OUTPATIENT
Start: 2023-04-13 | End: 2023-04-27

## 2023-04-13 RX ORDER — OXYCODONE HYDROCHLORIDE 5 MG/1
1 TABLET ORAL
Qty: 8 | Refills: 0
Start: 2023-04-13 | End: 2023-04-14

## 2023-04-13 RX ORDER — ONDANSETRON 8 MG/1
4 TABLET, FILM COATED ORAL ONCE
Refills: 0 | Status: DISCONTINUED | OUTPATIENT
Start: 2023-04-13 | End: 2023-04-27

## 2023-04-13 RX ORDER — OMEGA-3 ACID ETHYL ESTERS 1 G
1 CAPSULE ORAL
Refills: 0 | DISCHARGE

## 2023-04-13 RX ORDER — CHLORHEXIDINE GLUCONATE 213 G/1000ML
1 SOLUTION TOPICAL ONCE
Refills: 0 | Status: COMPLETED | OUTPATIENT
Start: 2023-04-13 | End: 2023-04-13

## 2023-04-13 RX ORDER — TADALAFIL 10 MG/1
1 TABLET, FILM COATED ORAL
Refills: 0 | DISCHARGE

## 2023-04-13 RX ORDER — BACILLUS COAGULANS/INULIN 1B-250 MG
1 CAPSULE ORAL
Refills: 0 | DISCHARGE

## 2023-04-13 RX ORDER — CLONAZEPAM 1 MG
0 TABLET ORAL
Refills: 0 | DISCHARGE

## 2023-04-13 RX ADMIN — HYDROMORPHONE HYDROCHLORIDE 0.25 MILLIGRAM(S): 2 INJECTION INTRAMUSCULAR; INTRAVENOUS; SUBCUTANEOUS at 08:54

## 2023-04-13 RX ADMIN — CHLORHEXIDINE GLUCONATE 1 APPLICATION(S): 213 SOLUTION TOPICAL at 05:57

## 2023-04-13 RX ADMIN — OXYCODONE HYDROCHLORIDE 5 MILLIGRAM(S): 5 TABLET ORAL at 09:29

## 2023-04-13 RX ADMIN — SODIUM CHLORIDE 100 MILLILITER(S): 9 INJECTION, SOLUTION INTRAVENOUS at 05:57

## 2023-04-13 NOTE — ASU DISCHARGE PLAN (ADULT/PEDIATRIC) - CARE PROVIDER_API CALL
Leeroy Heart)  ColonRectal Surgery; Surgery  [address], [address]  [place] [address]  Phone: [phone]  Fax: [phone]  Follow Up Time: 2 weeks

## 2023-04-13 NOTE — ASU DISCHARGE PLAN (ADULT/PEDIATRIC) - ASU DC SPECIAL INSTRUCTIONSFT
WOUND CARE: On your incisions, you have surgical tape called steristrips placed above them. Do not remove the steristrips. They will fall off naturally.   BATHING: Please do not submerge wound underwater. You may shower and/or sponge bathe. Please pat wound dry after showering.    ACTIVITY: No heavy lifting anything more than 10-15lbs or straining. Otherwise, you may return to your usual level of physical activity. If you are taking narcotic pain medication (such as oxycodone or Percocet), do NOT drive a car, operate machinery or make important decisions.  PAIN: Please take tylenol 1000mg every 6 hours   you may take oxycodone 2.5mg (1/2 tab) as needed every 6 hours for moderate breakthrough pain or oxycodone 5mg (1 tab) every 6 hours as needed for severe breakthrough pain   NOTIFY YOUR SURGEON IF: You have any excessive bleeding or pus draining from your wound, any fever (over 101 F) or chills, persistent nausea/vomiting with inability to tolerate food or liquids, persistent diarrhea, or if your pain is not controlled on your discharge pain medications.  FOLLOW-UP:  1. Please call to make a follow-up appointment in 2 week upon discharge from the hospital - Please call immediately upon discharge to schedule the appointment with Dr. Heart   2. Please follow up with your primary care physician in one week regarding your hospitalization.

## 2023-04-13 NOTE — ASU DISCHARGE PLAN (ADULT/PEDIATRIC) - NS MD DC FALL RISK RISK
For information on Fall & Injury Prevention, visit: [email or web address] OR  [email or web address] OR  https://www.cdc.gov/steadi/patient.html

## 2023-04-13 NOTE — ASU DISCHARGE PLAN (ADULT/PEDIATRIC) - NURSING INSTRUCTIONS
OK to take Tylenol/Acetaminophen at/after 02:00Pm___ for pain and every 6 hours after as needed. YOu may take Oxycodone 5mg at/after 03:30PM, as per MD order.

## 2023-04-13 NOTE — BRIEF OPERATIVE NOTE - OPERATION/FINDINGS
Supraumbilical incision made to introduce laparascope. 3 additional incisions made for the 5 mm ports. Critical view achieved and cystic duct and cystic artery clipped and cut. Gallbladder was removed from gallbladder fossa. Hemostasis achieved. Incisions closed with 4.0 monocryl sutures. Steristrips placed above incision.

## 2023-04-13 NOTE — ASU PATIENT PROFILE, ADULT - NSICDXPASTMEDICALHX_GEN_ALL_CORE_FT
PAST MEDICAL HISTORY:  Chronic back pain     Disc displacement, lumbar     ED (erectile dysfunction)     GERD (gastroesophageal reflux disease)     H/O acute cholecystitis     H/O acute pancreatitis     Lipoma     Prostate cancer

## 2023-04-14 PROBLEM — C61 MALIGNANT NEOPLASM OF PROSTATE: Chronic | Status: ACTIVE | Noted: 2023-03-27

## 2023-04-14 PROBLEM — M54.9 DORSALGIA, UNSPECIFIED: Chronic | Status: ACTIVE | Noted: 2023-03-27

## 2023-04-14 PROBLEM — D17.9 BENIGN LIPOMATOUS NEOPLASM, UNSPECIFIED: Chronic | Status: ACTIVE | Noted: 2023-03-27

## 2023-04-14 PROBLEM — M51.26 OTHER INTERVERTEBRAL DISC DISPLACEMENT, LUMBAR REGION: Chronic | Status: ACTIVE | Noted: 2023-03-27

## 2023-04-14 PROBLEM — N52.9 MALE ERECTILE DYSFUNCTION, UNSPECIFIED: Chronic | Status: ACTIVE | Noted: 2023-03-27

## 2023-04-14 PROBLEM — Z87.19 PERSONAL HISTORY OF OTHER DISEASES OF THE DIGESTIVE SYSTEM: Chronic | Status: ACTIVE | Noted: 2023-03-27

## 2023-04-24 LAB — SURGICAL PATHOLOGY STUDY: SIGNIFICANT CHANGE UP

## 2023-04-27 ENCOUNTER — APPOINTMENT (OUTPATIENT)
Dept: ORTHOPEDIC SURGERY | Facility: CLINIC | Age: 78
End: 2023-04-27
Payer: MEDICARE

## 2023-04-27 VITALS — WEIGHT: 180 LBS | BODY MASS INDEX: 28.25 KG/M2 | HEIGHT: 67 IN

## 2023-04-27 PROCEDURE — 99213 OFFICE O/P EST LOW 20 MIN: CPT

## 2023-04-27 NOTE — HISTORY OF PRESENT ILLNESS
[6] : 6 [0] : 0 [Dull/Aching] : dull/aching [Sharp] : sharp [Intermittent] : intermittent [de-identified] : 04/27/23:  Returns today with some continued back pain, but is feeling about 50% better since the previous visit after MDP x1  He is also two weeks post cholecystectomy.\par \par 04/03/23:  Return visit for this 77 year male c/o recurring LBP x last 2 months duration. Has been doing PT 1x/week x last 2 months w/o relief. Worse getting OOB in am and arising from a sitting position. Localized to LB and radiates to both buttocks. Taking tylenol prn w/o relief. \par PMH: had a gallbladder attack around 2/14/23. Went to ER and was sent home. Now scheduled for gallbladder excision on 4/13/23.\par \par 11/01/22:  Returns today two weeks after right shoulder cortisone injection. Feeling about 63 yo 70% better after injection.  Could not continue meloxicam due to GERD.  In PT for his shoulder 1-2x/week  and feels it helps.\par \par Also complaining of a flare-up of lower back pain.  No leg pain. His back pain improved after PT and then resolved with the summer weather, but is worsening again over the last month after the weather has become cooler and after playing with his 2 year old granddaughter.  Not taking any medication for pain.  Heating pad once in a while.  A hot shower helps.\par \par 10/14/22:  Patient is here today for his right shoulder.  Had seen Dr. Anguiano two months ago who prescribed meloxicam and PT.  He did have an MRI.   States he had to stop the meloxicam after five days due to GERD and stomach upset.  Is doing a little better, but still with pain. \par \par Impression: \par 1. High-grade partial tearing and delamination of the supraspinatus tendon insertion with moderate surrounding bursitis without retraction or atrophy.\par 2. Superior labral tearing, infraspinatus and subscapularis tendinopathy and biceps tenosynovitis with mild effusion, capsulitis, bursitis and AC joint arthrosis.\par 3. No acute fracture or disproportionate muscle atrophy.\par \par 9/7/22: MRI review of the right shoulder. \par \par This is a 77 YO male RHD, with right shoulder pain that gradually onset of yanking a lawnmower. +Motrin use. Some night symptoms. C/o of nagging pulling pain, into trap and triceps. Denies n/t. Denies prior history. \par \par 10/19/21: Return visit for a 75 year old male here today for acute onset of LBP x 1 week ago. Localized to LB. No leg pain. Constant and daily. Went for acupuncture x 2 visits w/ slight relief only. Wearing lumbar support. Worse in am getting OOB. No wake up pain at night. pain is a "5-6". Tried aleve 2 pills prn w/o relief.\par PMH: had acute LBP x 12 years ago after lifting a heavy tree trunk. has had recurring episodes 1-2x/year [] : no [FreeTextEntry1] : back [de-identified] : pt

## 2023-04-27 NOTE — PHYSICAL EXAM
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

## 2023-04-28 ENCOUNTER — APPOINTMENT (OUTPATIENT)
Dept: SURGERY | Facility: CLINIC | Age: 78
End: 2023-04-28
Payer: MEDICARE

## 2023-05-05 ENCOUNTER — APPOINTMENT (OUTPATIENT)
Dept: SURGERY | Facility: CLINIC | Age: 78
End: 2023-05-05
Payer: MEDICARE

## 2023-05-05 VITALS
SYSTOLIC BLOOD PRESSURE: 144 MMHG | HEART RATE: 75 BPM | OXYGEN SATURATION: 98 % | RESPIRATION RATE: 16 BRPM | DIASTOLIC BLOOD PRESSURE: 80 MMHG | TEMPERATURE: 96.8 F

## 2023-05-05 DIAGNOSIS — Z09 ENCOUNTER FOR FOLLOW-UP EXAMINATION AFTER COMPLETED TREATMENT FOR CONDITIONS OTHER THAN MALIGNANT NEOPLASM: ICD-10-CM

## 2023-05-05 PROCEDURE — 99024 POSTOP FOLLOW-UP VISIT: CPT

## 2023-05-05 NOTE — HISTORY OF PRESENT ILLNESS
[de-identified] : Polo is a 76 y/o male here for a post-op visit.\par \par s/p Laparoscopic  cholecystectomy on 04/13/23 due to gallstone pancreatitis\par \par Hx of prostate cancer, RT ended in 2019. \par \par Today patient denies surgical incisional pain.  BMs formed 1-2 daily.   Fair appetite.   Denies nausea, vomiting, fever or chills.  Surgical incision MAK. Pt is voiding normally. \par \par

## 2023-05-05 NOTE — ASSESSMENT
[FreeTextEntry1] : In summary the patient is doing well status post laparoscopic cholecystectomy.  I instructed him that he may resume his normal activities as tolerated and only needs to see me as needed.  He will follow-up with Dr. Alan in 2 months for a surveillance MRI of his pancreas

## 2023-05-16 ENCOUNTER — OUTPATIENT (OUTPATIENT)
Dept: OUTPATIENT SERVICES | Facility: HOSPITAL | Age: 78
LOS: 1 days | End: 2023-05-16
Payer: MEDICARE

## 2023-05-16 ENCOUNTER — APPOINTMENT (OUTPATIENT)
Dept: MRI IMAGING | Facility: CLINIC | Age: 78
End: 2023-05-16
Payer: MEDICARE

## 2023-05-16 DIAGNOSIS — Z98.890 OTHER SPECIFIED POSTPROCEDURAL STATES: Chronic | ICD-10-CM

## 2023-05-16 DIAGNOSIS — Z00.8 ENCOUNTER FOR OTHER GENERAL EXAMINATION: ICD-10-CM

## 2023-05-16 DIAGNOSIS — Z90.89 ACQUIRED ABSENCE OF OTHER ORGANS: Chronic | ICD-10-CM

## 2023-05-16 PROCEDURE — 74183 MRI ABD W/O CNTR FLWD CNTR: CPT | Mod: 26

## 2023-05-16 PROCEDURE — A9585: CPT

## 2023-05-16 PROCEDURE — 74183 MRI ABD W/O CNTR FLWD CNTR: CPT

## 2023-06-20 ENCOUNTER — OUTPATIENT (OUTPATIENT)
Dept: OUTPATIENT SERVICES | Facility: HOSPITAL | Age: 78
LOS: 1 days | End: 2023-06-20
Payer: MEDICARE

## 2023-06-20 ENCOUNTER — APPOINTMENT (OUTPATIENT)
Dept: MRI IMAGING | Facility: CLINIC | Age: 78
End: 2023-06-20
Payer: MEDICARE

## 2023-06-20 DIAGNOSIS — Z98.890 OTHER SPECIFIED POSTPROCEDURAL STATES: Chronic | ICD-10-CM

## 2023-06-20 DIAGNOSIS — Z00.8 ENCOUNTER FOR OTHER GENERAL EXAMINATION: ICD-10-CM

## 2023-06-20 DIAGNOSIS — Z90.89 ACQUIRED ABSENCE OF OTHER ORGANS: Chronic | ICD-10-CM

## 2023-06-20 PROCEDURE — 72148 MRI LUMBAR SPINE W/O DYE: CPT

## 2023-06-20 PROCEDURE — 72148 MRI LUMBAR SPINE W/O DYE: CPT | Mod: 26

## 2023-07-31 NOTE — PRE-ANESTHESIA EVALUATION ADULT - NSANTHBPHIGHRD_ENT_A_CORE
Head, normocephalic, atraumatic, Face, Face within normal limits, Ears, External ears within normal limits
No

## 2023-08-11 ENCOUNTER — APPOINTMENT (OUTPATIENT)
Dept: INTERNAL MEDICINE | Facility: CLINIC | Age: 78
End: 2023-08-11

## 2023-08-31 ENCOUNTER — NON-APPOINTMENT (OUTPATIENT)
Age: 78
End: 2023-08-31

## 2023-09-16 ENCOUNTER — RESULT CHARGE (OUTPATIENT)
Age: 78
End: 2023-09-16

## 2023-09-18 ENCOUNTER — APPOINTMENT (OUTPATIENT)
Dept: INTERNAL MEDICINE | Facility: CLINIC | Age: 78
End: 2023-09-18
Payer: MEDICARE

## 2023-09-18 ENCOUNTER — LABORATORY RESULT (OUTPATIENT)
Age: 78
End: 2023-09-18

## 2023-09-18 ENCOUNTER — NON-APPOINTMENT (OUTPATIENT)
Age: 78
End: 2023-09-18

## 2023-09-18 VITALS
OXYGEN SATURATION: 98 % | RESPIRATION RATE: 16 BRPM | SYSTOLIC BLOOD PRESSURE: 122 MMHG | TEMPERATURE: 95.8 F | WEIGHT: 179 LBS | DIASTOLIC BLOOD PRESSURE: 72 MMHG | HEIGHT: 67 IN | BODY MASS INDEX: 28.09 KG/M2 | HEART RATE: 102 BPM

## 2023-09-18 DIAGNOSIS — Z71.85 ENCOUNTER FOR IMMUNIZATION SAFETY COUNSELING: ICD-10-CM

## 2023-09-18 DIAGNOSIS — F51.04 PSYCHOPHYSIOLOGIC INSOMNIA: ICD-10-CM

## 2023-09-18 DIAGNOSIS — Z00.00 ENCOUNTER FOR GENERAL ADULT MEDICAL EXAMINATION W/OUT ABNORMAL FINDINGS: ICD-10-CM

## 2023-09-18 DIAGNOSIS — R10.9 UNSPECIFIED ABDOMINAL PAIN: ICD-10-CM

## 2023-09-18 DIAGNOSIS — Z13.31 ENCOUNTER FOR SCREENING FOR DEPRESSION: ICD-10-CM

## 2023-09-18 PROCEDURE — 93000 ELECTROCARDIOGRAM COMPLETE: CPT | Mod: 59

## 2023-09-18 PROCEDURE — 36415 COLL VENOUS BLD VENIPUNCTURE: CPT

## 2023-09-18 PROCEDURE — 99397 PER PM REEVAL EST PAT 65+ YR: CPT | Mod: 25

## 2023-09-18 PROCEDURE — G0444 DEPRESSION SCREEN ANNUAL: CPT | Mod: 59

## 2023-09-18 PROCEDURE — 99214 OFFICE O/P EST MOD 30 MIN: CPT | Mod: 25

## 2023-09-18 RX ORDER — HYOSCYAMINE SULFATE 0.12 MG/1
0.12 TABLET, ORALLY DISINTEGRATING ORAL
Qty: 120 | Refills: 0 | Status: ACTIVE | COMMUNITY
Start: 2021-10-05 | End: 1900-01-01

## 2023-09-20 ENCOUNTER — LABORATORY RESULT (OUTPATIENT)
Age: 78
End: 2023-09-20

## 2023-09-20 LAB
APPEARANCE: CLEAR
BILIRUBIN URINE: NEGATIVE
BLOOD URINE: NEGATIVE
COLOR: NORMAL
GLUCOSE QUALITATIVE U: NEGATIVE MG/DL
KETONES URINE: NEGATIVE MG/DL
LEUKOCYTE ESTERASE URINE: NEGATIVE
NITRITE URINE: NEGATIVE
PH URINE: 5
PROTEIN URINE: 30 MG/DL
SPECIFIC GRAVITY URINE: 1.02
UROBILINOGEN URINE: 0.2 MG/DL

## 2023-09-21 LAB
25(OH)D3 SERPL-MCNC: 40.7 NG/ML
ALBUMIN SERPL ELPH-MCNC: 4.5 G/DL
ALP BLD-CCNC: 95 U/L
ALT SERPL-CCNC: 15 U/L
ANION GAP SERPL CALC-SCNC: 19 MMOL/L
AST SERPL-CCNC: 16 U/L
BASOPHILS # BLD AUTO: 0.02 K/UL
BASOPHILS NFR BLD AUTO: 0.3 %
BILIRUB SERPL-MCNC: 0.4 MG/DL
BUN SERPL-MCNC: 16 MG/DL
CALCIUM SERPL-MCNC: 10.2 MG/DL
CHLORIDE SERPL-SCNC: 105 MMOL/L
CHOLEST SERPL-MCNC: 179 MG/DL
CO2 SERPL-SCNC: 19 MMOL/L
CREAT SERPL-MCNC: 1.1 MG/DL
EGFR: 70 ML/MIN/1.73M2
EOSINOPHIL # BLD AUTO: 0.49 K/UL
EOSINOPHIL NFR BLD AUTO: 6.8 %
ESTIMATED AVERAGE GLUCOSE: 108 MG/DL
GLUCOSE SERPL-MCNC: 102 MG/DL
HBA1C MFR BLD HPLC: 5.4 %
HCT VFR BLD CALC: 44.2 %
HDLC SERPL-MCNC: 37 MG/DL
HGB BLD-MCNC: 12.9 G/DL
IMM GRANULOCYTES NFR BLD AUTO: 1 %
LDLC SERPL CALC-MCNC: 85 MG/DL
LYMPHOCYTES # BLD AUTO: 1.6 K/UL
LYMPHOCYTES NFR BLD AUTO: 22.2 %
MAN DIFF?: NORMAL
MCHC RBC-ENTMCNC: 19.8 PG
MCHC RBC-ENTMCNC: 29.2 GM/DL
MCV RBC AUTO: 67.8 FL
MONOCYTES # BLD AUTO: 0.88 K/UL
MONOCYTES NFR BLD AUTO: 12.2 %
NEUTROPHILS # BLD AUTO: 4.14 K/UL
NEUTROPHILS NFR BLD AUTO: 57.5 %
NONHDLC SERPL-MCNC: 142 MG/DL
PLATELET # BLD AUTO: 209 K/UL
POTASSIUM SERPL-SCNC: 4.3 MMOL/L
PROT SERPL-MCNC: 7.1 G/DL
RBC # BLD: 6.52 M/UL
RBC # FLD: 19.9 %
SODIUM SERPL-SCNC: 143 MMOL/L
TRIGL SERPL-MCNC: 347 MG/DL
TSH SERPL-ACNC: 0.65 UIU/ML
WBC # FLD AUTO: 7.2 K/UL

## 2023-09-22 LAB — PSA SERPL-MCNC: 0.8 NG/ML

## 2023-09-28 ENCOUNTER — APPOINTMENT (OUTPATIENT)
Dept: CT IMAGING | Facility: CLINIC | Age: 78
End: 2023-09-28

## 2023-10-12 ENCOUNTER — APPOINTMENT (OUTPATIENT)
Dept: CT IMAGING | Facility: CLINIC | Age: 78
End: 2023-10-12
Payer: MEDICARE

## 2023-10-12 PROCEDURE — 75571 CT HRT W/O DYE W/CA TEST: CPT

## 2023-10-15 RX ORDER — ASPIRIN 81 MG/1
81 TABLET ORAL DAILY
Qty: 90 | Refills: 0 | Status: ACTIVE | COMMUNITY
Start: 2023-10-15

## 2023-11-13 ENCOUNTER — NON-APPOINTMENT (OUTPATIENT)
Age: 78
End: 2023-11-13

## 2023-11-13 ENCOUNTER — APPOINTMENT (OUTPATIENT)
Dept: CARDIOLOGY | Facility: CLINIC | Age: 78
End: 2023-11-13
Payer: MEDICARE

## 2023-11-13 VITALS
BODY MASS INDEX: 26.68 KG/M2 | OXYGEN SATURATION: 99 % | HEART RATE: 86 BPM | HEIGHT: 67 IN | SYSTOLIC BLOOD PRESSURE: 137 MMHG | WEIGHT: 170 LBS | DIASTOLIC BLOOD PRESSURE: 79 MMHG

## 2023-11-13 DIAGNOSIS — R94.31 ABNORMAL ELECTROCARDIOGRAM [ECG] [EKG]: ICD-10-CM

## 2023-11-13 PROCEDURE — 99204 OFFICE O/P NEW MOD 45 MIN: CPT | Mod: 25

## 2023-11-13 PROCEDURE — 93000 ELECTROCARDIOGRAM COMPLETE: CPT

## 2023-11-18 ENCOUNTER — NON-APPOINTMENT (OUTPATIENT)
Age: 78
End: 2023-11-18

## 2023-12-01 ENCOUNTER — APPOINTMENT (OUTPATIENT)
Dept: CARDIOLOGY | Facility: CLINIC | Age: 78
End: 2023-12-01

## 2023-12-06 ENCOUNTER — NON-APPOINTMENT (OUTPATIENT)
Age: 78
End: 2023-12-06

## 2023-12-08 ENCOUNTER — APPOINTMENT (OUTPATIENT)
Dept: CARDIOLOGY | Facility: CLINIC | Age: 78
End: 2023-12-08
Payer: MEDICARE

## 2023-12-08 PROCEDURE — A9500: CPT

## 2023-12-08 PROCEDURE — 93015 CV STRESS TEST SUPVJ I&R: CPT

## 2023-12-08 PROCEDURE — 78452 HT MUSCLE IMAGE SPECT MULT: CPT

## 2023-12-21 ENCOUNTER — NON-APPOINTMENT (OUTPATIENT)
Age: 78
End: 2023-12-21

## 2024-01-05 ENCOUNTER — NON-APPOINTMENT (OUTPATIENT)
Age: 79
End: 2024-01-05

## 2024-01-16 ENCOUNTER — APPOINTMENT (OUTPATIENT)
Dept: INTERNAL MEDICINE | Facility: CLINIC | Age: 79
End: 2024-01-16
Payer: MEDICARE

## 2024-01-16 VITALS
TEMPERATURE: 97.3 F | HEIGHT: 67 IN | SYSTOLIC BLOOD PRESSURE: 124 MMHG | WEIGHT: 169.38 LBS | HEART RATE: 101 BPM | DIASTOLIC BLOOD PRESSURE: 70 MMHG | RESPIRATION RATE: 16 BRPM | BODY MASS INDEX: 26.58 KG/M2 | OXYGEN SATURATION: 98 %

## 2024-01-16 DIAGNOSIS — I25.10 ATHEROSCLEROTIC HEART DISEASE OF NATIVE CORONARY ARTERY W/OUT ANGINA PECTORIS: ICD-10-CM

## 2024-01-16 DIAGNOSIS — E78.1 PURE HYPERGLYCERIDEMIA: ICD-10-CM

## 2024-01-16 DIAGNOSIS — E78.5 HYPERLIPIDEMIA, UNSPECIFIED: ICD-10-CM

## 2024-01-16 DIAGNOSIS — I25.84 ATHEROSCLEROTIC HEART DISEASE OF NATIVE CORONARY ARTERY W/OUT ANGINA PECTORIS: ICD-10-CM

## 2024-01-16 DIAGNOSIS — Z23 ENCOUNTER FOR IMMUNIZATION: ICD-10-CM

## 2024-01-16 PROCEDURE — 90677 PCV20 VACCINE IM: CPT

## 2024-01-16 PROCEDURE — 36415 COLL VENOUS BLD VENIPUNCTURE: CPT

## 2024-01-16 PROCEDURE — G0009: CPT

## 2024-01-16 PROCEDURE — 99214 OFFICE O/P EST MOD 30 MIN: CPT | Mod: 25

## 2024-01-16 RX ORDER — METHYLPREDNISOLONE 4 MG/1
4 TABLET ORAL
Qty: 1 | Refills: 1 | Status: DISCONTINUED | COMMUNITY
Start: 2023-04-03 | End: 2024-01-16

## 2024-01-16 NOTE — ASSESSMENT
[FreeTextEntry1] : Patient is a 78-year-old male with a past medical history as above who presents for fasting blood work and general follow-up.

## 2024-01-16 NOTE — PHYSICAL EXAM
[No Acute Distress] : no acute distress [Well Nourished] : well nourished [Well Developed] : well developed [Well-Appearing] : well-appearing [Normal Sclera/Conjunctiva] : normal sclera/conjunctiva [PERRL] : pupils equal round and reactive to light [EOMI] : extraocular movements intact [Normal Outer Ear/Nose] : the outer ears and nose were normal in appearance [Normal Oropharynx] : the oropharynx was normal [No JVD] : no jugular venous distention [No Lymphadenopathy] : no lymphadenopathy [Supple] : supple [Thyroid Normal, No Nodules] : the thyroid was normal and there were no nodules present [No Respiratory Distress] : no respiratory distress  [No Accessory Muscle Use] : no accessory muscle use [Clear to Auscultation] : lungs were clear to auscultation bilaterally [Normal Rate] : normal rate  [Regular Rhythm] : with a regular rhythm [Normal S1, S2] : normal S1 and S2 [No Murmur] : no murmur heard [No Carotid Bruits] : no carotid bruits [No Abdominal Bruit] : a ~M bruit was not heard ~T in the abdomen [No Varicosities] : no varicosities [Pedal Pulses Present] : the pedal pulses are present [No Edema] : there was no peripheral edema [No Palpable Aorta] : no palpable aorta [No Extremity Clubbing/Cyanosis] : no extremity clubbing/cyanosis [Soft] : abdomen soft [Non Tender] : non-tender [Non-distended] : non-distended [No Masses] : no abdominal mass palpated [No HSM] : no HSM [Normal Bowel Sounds] : normal bowel sounds [Normal Posterior Cervical Nodes] : no posterior cervical lymphadenopathy [Normal Anterior Cervical Nodes] : no anterior cervical lymphadenopathy [No CVA Tenderness] : no CVA  tenderness [No Spinal Tenderness] : no spinal tenderness [No Joint Swelling] : no joint swelling [Grossly Normal Strength/Tone] : grossly normal strength/tone [No Rash] : no rash [Coordination Grossly Intact] : coordination grossly intact [No Focal Deficits] : no focal deficits [Normal Gait] : normal gait [Deep Tendon Reflexes (DTR)] : deep tendon reflexes were 2+ and symmetric [Normal Affect] : the affect was normal [Normal Insight/Judgement] : insight and judgment were intact [Normal] : affect was normal and insight and judgment were intact [Normal Voice/Communication] : normal voice/communication [Normal TMs] : both tympanic membranes were normal [Normal Nasal Mucosa] : the nasal mucosa was normal [Normal Supraclavicular Nodes] : no supraclavicular lymphadenopathy [Kyphosis] : no kyphosis [Scoliosis] : no scoliosis [Acne] : no acne [Speech Grossly Normal] : speech grossly normal [Memory Grossly Normal] : memory grossly normal [Alert and Oriented x3] : oriented to person, place, and time [Normal Mood] : the mood was normal

## 2024-01-16 NOTE — PLAN
[FreeTextEntry1] : Cardiology elevated BP readings w/o Dx of HTN - previously advised low sodium diet; will continue to monitor BP hypertriglyceridemia - continue low cholesterol/low fat and low carbohydrate/low sugar diet - check FLP CAD, 3vessel, non obstructive-continue with Atorvastatin 10 mg qd and ASA 81 mg qd, s/p nuclear stress test (11/13/23) continue to follow up with cardiologist Dr. Katz  Endocrinology hyperglycemia - continue low carbohydrate/low sugar diet  Continue Vitamin D-3 2000 IU p.o.q.d. with a meal as directed  Oncology/Urology prostate cancer - s/p radiation/hormone therapy - continue to follow up with urologist, Dr. Clifford Handler  Gastroenterology GERD - continue anti-reflux measures, Pantoperazole and Famotadine Immunization  Pneumonia vaccine - Prevnar 20 0.5mL x1 administered intramuscularly to left deltoid    check CMP and FLP Follow up in 6 months

## 2024-01-16 NOTE — HISTORY OF PRESENT ILLNESS
[FreeTextEntry1] : fasting blood work and general follow-up [de-identified] : Patient is a 78-year-old male with a past medical history as below who presents for fasting blood work and general follow-up. Patient is currently taking prescription medications as noted below with no adverse reactions or side effects. Patient's last colonoscopy was in January of 2022 with gastroenterologist Dr. Raimundo Alan; due in 2027. Patient is vaccinated against COVID-19 (x3). He has received the Pneumovax 23 vaccine in March of 2017. Patient inquires about receiving the Prevnar 20 vaccine today. Patent has received the TDap vaccine in June of 2020.  Patient has lost 10 pounds since his last office visit. Patient has been maintaining a balanced diet and exercises regularly (3x a week: weights, treadmill).   Patient regularly follows up with oncology/ given history of prostate cancer, s/p radiation/hormone therapy.  Last labs dated 09/20/2023 noted elevated glucose levels at 102 and elevated triglyceride levels at 347.   Patient saw cardiologist, Dr. Katz, in November of 2023 for initial consultation. He was found to have a calcium score of 961 and has since started taking Atorvastatin 10 mg qd and baby aspirin 81 mg daily for treatment of non-obstructive CAD. Notes feeling well on the medications. He had an exercise nuclear stress test done to r/o ischemia which was found to be negative.

## 2024-01-16 NOTE — ADDENDUM
[FreeTextEntry1] : I, Sabrina Bob, acted solely as scribe for Dr. Kelvin Pimentel DO this date 01/16/2024.  All medical record entries made by the Scribe were at my, Dr. Kelvin Pimentel DO direction and personally dictated by me on 01/16/2024. I have reviewed the chart and agree that the record accurately reflects my personal performance of the history, physical exam, assessment and plan. I have also personally directed, reviewed and agreed with the chart.

## 2024-01-17 LAB
ALBUMIN SERPL ELPH-MCNC: 4.7 G/DL
ALP BLD-CCNC: 87 U/L
ALT SERPL-CCNC: 22 U/L
ANION GAP SERPL CALC-SCNC: 12 MMOL/L
AST SERPL-CCNC: 23 U/L
BILIRUB SERPL-MCNC: 0.8 MG/DL
BUN SERPL-MCNC: 19 MG/DL
CALCIUM SERPL-MCNC: 10.2 MG/DL
CHLORIDE SERPL-SCNC: 104 MMOL/L
CHOLEST SERPL-MCNC: 143 MG/DL
CO2 SERPL-SCNC: 23 MMOL/L
CREAT SERPL-MCNC: 1.2 MG/DL
EGFR: 63 ML/MIN/1.73M2
GLUCOSE SERPL-MCNC: 97 MG/DL
HDLC SERPL-MCNC: 43 MG/DL
LDLC SERPL CALC-MCNC: 74 MG/DL
NONHDLC SERPL-MCNC: 101 MG/DL
POTASSIUM SERPL-SCNC: 4.5 MMOL/L
PROT SERPL-MCNC: 7.5 G/DL
SODIUM SERPL-SCNC: 140 MMOL/L
TRIGL SERPL-MCNC: 153 MG/DL

## 2024-02-06 NOTE — H&P PST ADULT - BIRTH SEX
-Follow up PRN  -Keep incisional sites clean and dry  -Avoid lifting objects > 20 pounds x 6 weeks post op  -Resume diet as tolerated   -Follow up with PCP PRN for routine medical management   
Male

## 2024-03-18 ENCOUNTER — RX RENEWAL (OUTPATIENT)
Age: 79
End: 2024-03-18

## 2024-03-18 RX ORDER — ATORVASTATIN CALCIUM 10 MG/1
10 TABLET, FILM COATED ORAL DAILY
Qty: 90 | Refills: 1 | Status: ACTIVE | COMMUNITY
Start: 2023-10-15 | End: 1900-01-01

## 2024-04-18 ENCOUNTER — APPOINTMENT (OUTPATIENT)
Dept: ORTHOPEDIC SURGERY | Facility: CLINIC | Age: 79
End: 2024-04-18
Payer: MEDICARE

## 2024-04-18 VITALS — WEIGHT: 170 LBS | HEIGHT: 67 IN | BODY MASS INDEX: 26.68 KG/M2

## 2024-04-18 DIAGNOSIS — M43.06 SPONDYLOLYSIS, LUMBAR REGION: ICD-10-CM

## 2024-04-18 PROCEDURE — 72100 X-RAY EXAM L-S SPINE 2/3 VWS: CPT

## 2024-04-18 PROCEDURE — 99213 OFFICE O/P EST LOW 20 MIN: CPT

## 2024-04-18 NOTE — PLAN
[TextEntry] : We discussed at length with the patient the options for treatment.  We discussed conservative care including physical therapy, acupuncture, massage therapy and chiropractic care.  We discussed injection therapy and even surgical intervention should the patient fail conservative care.  We discussed, risks, benefits, complications, alternatives, outcomes and expectations.   All questions answered.   Patient was advised to continue with physical therapy.   Medrol dose pack was prescribed. Risks and benefits were explained including but not limited to the possibilities of gastritis, indigestion, and other GI side effects. It was also explained that in patients with a history of diabetes mellitus, it may temporarily elevate their blood sugars which should be monitored at home. A refill was also prescribed to take the subsequent week if needed.

## 2024-04-18 NOTE — PHYSICAL EXAM
[Normal Mood and Affect] : normal mood and affect [Oriented] : oriented [Able to Communicate] : able to communicate [Normal Skin] : normal skin [Well Developed] : well developed [Well Nourished] : well nourished [NL (30)] : right lateral bending 30 degrees [] : non-antalgic [Disc space narrowing] : Disc space narrowing [Spondylolithesis] : Spondylolithesis [FreeTextEntry1] : severe DDD at L2-L3 and grade 1-2 spondylo at L5-S1 with pars defect L5 [[id number]] : forward flexion 75 degrees

## 2024-04-18 NOTE — HISTORY OF PRESENT ILLNESS
[Lower back] : lower back [5] : 5 [Dull/Aching] : dull/aching [Tightness] : tightness [Intermittent] : intermittent [Ice] : ice [Sitting] : sitting [de-identified] : 04/18/24:  Return visit for this 78 year old male complaining of spontaneous onset of recurring lt sided LBP radiating down lt buttock and thigh x last 2 weeks duration. Going to PT 1x/week and saw a chiro x 2 visits so far with some improvement. Ltl eg pain has greatly improved. Wearing lumbar support.  04/27/23:  Returns today with some continued back pain, but is feeling about 50% better since the previous visit after MDP x1  He is also two weeks post cholecystectomy.  04/03/23:  Return visit for this 77 year male c/o recurring LBP x last 2 months duration. Has been doing PT 1x/week x last 2 months w/o relief. Worse getting OOB in am and arising from a sitting position. Localized to LB and radiates to both buttocks. Taking tylenol prn w/o relief.  PMH: had a gallbladder attack around 2/14/23. Went to ER and was sent home. Now scheduled for gallbladder excision on 4/13/23.  11/01/22:  Returns today two weeks after right shoulder cortisone injection. Feeling about 61 yo 70% better after injection.  Could not continue meloxicam due to GERD.  In PT for his shoulder 1-2x/week  and feels it helps.  Also complaining of a flare-up of lower back pain.  No leg pain. His back pain improved after PT and then resolved with the summer weather, but is worsening again over the last month after the weather has become cooler and after playing with his 2 year old granddaughter.  Not taking any medication for pain.  Heating pad once in a while.  A hot shower helps.  10/14/22:  Patient is here today for his right shoulder.  Had seen Dr. Anguiano two months ago who prescribed meloxicam and PT.  He did have an MRI.   States he had to stop the meloxicam after five days due to GERD and stomach upset.  Is doing a little better, but still with pain.   Impression:  1. High-grade partial tearing and delamination of the supraspinatus tendon insertion with moderate surrounding bursitis without retraction or atrophy. 2. Superior labral tearing, infraspinatus and subscapularis tendinopathy and biceps tenosynovitis with mild effusion, capsulitis, bursitis and AC joint arthrosis. 3. No acute fracture or disproportionate muscle atrophy.  9/7/22: MRI review of the right shoulder.   This is a 77 YO male RHD, with right shoulder pain that gradually onset of yanking a lawnmower. +Motrin use. Some night symptoms. C/o of nagging pulling pain, into trap and triceps. Denies n/t. Denies prior history.   10/19/21: Return visit for a 75 year old male here today for acute onset of LBP x 1 week ago. Localized to LB. No leg pain. Constant and daily. Went for acupuncture x 2 visits w/ slight relief only. Wearing lumbar support. Worse in am getting OOB. No wake up pain at night. pain is a "5-6". Tried aleve 2 pills prn w/o relief. PMH: had acute LBP x 12 years ago after lifting a heavy tree trunk. has had recurring episodes 1-2x/year [] : no [de-identified] : getting up [de-identified] : 4/24 [de-identified] : PT

## 2024-05-13 ENCOUNTER — APPOINTMENT (OUTPATIENT)
Dept: INTERNAL MEDICINE | Facility: CLINIC | Age: 79
End: 2024-05-13
Payer: MEDICARE

## 2024-05-13 VITALS
BODY MASS INDEX: 27.49 KG/M2 | WEIGHT: 175.13 LBS | TEMPERATURE: 96.4 F | RESPIRATION RATE: 16 BRPM | HEART RATE: 97 BPM | OXYGEN SATURATION: 98 % | HEIGHT: 67 IN | SYSTOLIC BLOOD PRESSURE: 130 MMHG | DIASTOLIC BLOOD PRESSURE: 80 MMHG

## 2024-05-13 DIAGNOSIS — K21.9 GASTRO-ESOPHAGEAL REFLUX DISEASE W/OUT ESOPHAGITIS: ICD-10-CM

## 2024-05-13 DIAGNOSIS — Z79.899 OTHER LONG TERM (CURRENT) DRUG THERAPY: ICD-10-CM

## 2024-05-13 DIAGNOSIS — Z86.2 PERSONAL HISTORY OF DISEASES OF THE BLOOD AND BLOOD-FORMING ORGANS AND CERTAIN DISORDERS INVOLVING THE IMMUNE MECHANISM: ICD-10-CM

## 2024-05-13 DIAGNOSIS — M51.36 OTHER INTERVERTEBRAL DISC DEGENERATION, LUMBAR REGION: ICD-10-CM

## 2024-05-13 DIAGNOSIS — R73.9 HYPERGLYCEMIA, UNSPECIFIED: ICD-10-CM

## 2024-05-13 DIAGNOSIS — C61 MALIGNANT NEOPLASM OF PROSTATE: ICD-10-CM

## 2024-05-13 DIAGNOSIS — M43.17 SPONDYLOLISTHESIS, LUMBOSACRAL REGION: ICD-10-CM

## 2024-05-13 PROCEDURE — G2211 COMPLEX E/M VISIT ADD ON: CPT

## 2024-05-13 PROCEDURE — 36415 COLL VENOUS BLD VENIPUNCTURE: CPT

## 2024-05-13 PROCEDURE — 99214 OFFICE O/P EST MOD 30 MIN: CPT

## 2024-05-13 RX ORDER — METHYLPREDNISOLONE 4 MG/1
4 TABLET ORAL
Qty: 1 | Refills: 1 | Status: DISCONTINUED | COMMUNITY
Start: 2024-04-18 | End: 2024-05-13

## 2024-05-14 PROBLEM — Z86.2 HISTORY OF THALASSEMIA MINOR: Status: ACTIVE | Noted: 2019-03-07

## 2024-05-14 PROBLEM — R73.9 HYPERGLYCEMIA: Status: ACTIVE | Noted: 2020-09-02

## 2024-05-14 PROBLEM — K21.9 GERD (GASTROESOPHAGEAL REFLUX DISEASE): Status: ACTIVE | Noted: 2019-03-15

## 2024-05-14 PROBLEM — M51.36 DISC DEGENERATION, LUMBAR: Status: ACTIVE | Noted: 2022-11-01

## 2024-05-14 PROBLEM — M43.17 SPONDYLOLISTHESIS AT L5-S1 LEVEL: Status: ACTIVE | Noted: 2022-11-01

## 2024-05-14 PROBLEM — Z79.899 MEDICATION MANAGEMENT: Status: ACTIVE | Noted: 2022-08-21

## 2024-05-14 PROBLEM — C61 PROSTATE CANCER: Status: ACTIVE | Noted: 2019-12-18

## 2024-05-14 NOTE — HISTORY OF PRESENT ILLNESS
[FreeTextEntry1] : fasting blood work [de-identified] : Patient is a 78-year-old male with a past medical history as below who presents for fasting blood work and general follow-up. He is doing  well, but c/o his chronic LB pain residual from his L5-S1 spondylolisthesis. He reports he is going to PT, and it helps. He notes the more he moves the better he feels. The patient remains active by going to the gym, doing his own landscaping, etc. Intermittently, he takes a Medrol dose pack to help with exacerbations of his chronic back pain under the direction of orthopedist. Reports his last episode of this was about 3 weeks ago and prior to this instance it's been a year since he's needed steroids.  The patient is doing well from a cardiology perspective. He recently had a stress test w/ Dr. Katz which was WNL. He does not take his baby aspirin daily because he is taking other supplements that have blood thinning properties (fish oil). He takes the baby aspirin every other day. He has been taking atorvastatin 10 mg as prescribed and is tolerating it. He reports that he was having significant GI adverse effects (gastritis, abdominal pain, loose stools, etc), but when he started taking his atorvastatin his issues resolved.  He now takes his hyoscyamine and pantoprazole PRN.   The patient reports he was on a very strict upon initially upon receiving CT heart/calcium score results. He has since d/c and is interested in seeing if it has impacted his lipids. He is up 5 lbs since his last visit, but attributes this to gaining muscle through resistance training.  The patient is s/p 5 years since his last prostate treatment. His PSA has been 0.6 ever since. The patient sees urologist Dr. Fortune and Dr. Torres.

## 2024-05-14 NOTE — PHYSICAL EXAM
[No Acute Distress] : no acute distress [Well Nourished] : well nourished [Well Developed] : well developed [Well-Appearing] : well-appearing [Normal Voice/Communication] : normal voice/communication [Normal Sclera/Conjunctiva] : normal sclera/conjunctiva [PERRL] : pupils equal round and reactive to light [EOMI] : extraocular movements intact [Normal Outer Ear/Nose] : the outer ears and nose were normal in appearance [Normal Oropharynx] : the oropharynx was normal [Normal TMs] : both tympanic membranes were normal [Normal Nasal Mucosa] : the nasal mucosa was normal [No JVD] : no jugular venous distention [No Lymphadenopathy] : no lymphadenopathy [Supple] : supple [Thyroid Normal, No Nodules] : the thyroid was normal and there were no nodules present [No Respiratory Distress] : no respiratory distress  [No Accessory Muscle Use] : no accessory muscle use [Clear to Auscultation] : lungs were clear to auscultation bilaterally [Normal Rate] : normal rate  [Regular Rhythm] : with a regular rhythm [Normal S1, S2] : normal S1 and S2 [No Murmur] : no murmur heard [No Carotid Bruits] : no carotid bruits [No Abdominal Bruit] : a ~M bruit was not heard ~T in the abdomen [Pedal Pulses Present] : the pedal pulses are present [No Edema] : there was no peripheral edema [No Palpable Aorta] : no palpable aorta [Soft] : abdomen soft [Non Tender] : non-tender [Non-distended] : non-distended [No Masses] : no abdominal mass palpated [No HSM] : no HSM [Normal Bowel Sounds] : normal bowel sounds [Normal Supraclavicular Nodes] : no supraclavicular lymphadenopathy [Normal Posterior Cervical Nodes] : no posterior cervical lymphadenopathy [Normal Anterior Cervical Nodes] : no anterior cervical lymphadenopathy [No CVA Tenderness] : no CVA  tenderness [No Spinal Tenderness] : no spinal tenderness [No Joint Swelling] : no joint swelling [Grossly Normal Strength/Tone] : grossly normal strength/tone [No Rash] : no rash [Coordination Grossly Intact] : coordination grossly intact [No Focal Deficits] : no focal deficits [Normal Gait] : normal gait [Deep Tendon Reflexes (DTR)] : deep tendon reflexes were 2+ and symmetric [Speech Grossly Normal] : speech grossly normal [Memory Grossly Normal] : memory grossly normal [Normal Affect] : the affect was normal [Alert and Oriented x3] : oriented to person, place, and time [Normal Mood] : the mood was normal [Normal Insight/Judgement] : insight and judgment were intact [Normal] : affect was normal and insight and judgment were intact [Kyphosis] : no kyphosis [Scoliosis] : no scoliosis [Acne] : no acne

## 2024-05-14 NOTE — ASSESSMENT
[FreeTextEntry1] : Patient is a 78-year-old male with a past medical history as above who presents for fasting blood work and general follow up.

## 2024-05-14 NOTE — REVIEW OF SYSTEMS
[Recent Change In Weight] : ~T recent weight change [Negative] : Heme/Lymph [FreeTextEntry2] : up 5 lbs since his last visit

## 2024-05-15 LAB
ALBUMIN SERPL ELPH-MCNC: 4.7 G/DL
ALP BLD-CCNC: 87 U/L
ALT SERPL-CCNC: 18 U/L
ANION GAP SERPL CALC-SCNC: 14 MMOL/L
AST SERPL-CCNC: 19 U/L
BILIRUB SERPL-MCNC: 0.9 MG/DL
BUN SERPL-MCNC: 21 MG/DL
CALCIUM SERPL-MCNC: 10.2 MG/DL
CHLORIDE SERPL-SCNC: 104 MMOL/L
CHOLEST SERPL-MCNC: 142 MG/DL
CO2 SERPL-SCNC: 22 MMOL/L
CREAT SERPL-MCNC: 1.2 MG/DL
EGFR: 63 ML/MIN/1.73M2
GLUCOSE SERPL-MCNC: 99 MG/DL
HDLC SERPL-MCNC: 44 MG/DL
LDLC SERPL CALC-MCNC: 71 MG/DL
NONHDLC SERPL-MCNC: 98 MG/DL
POTASSIUM SERPL-SCNC: 4.6 MMOL/L
PROT SERPL-MCNC: 7.2 G/DL
PSA FREE FLD-MCNC: 12 %
PSA FREE SERPL-MCNC: 0.06 NG/ML
PSA SERPL-MCNC: 0.52 NG/ML
SODIUM SERPL-SCNC: 141 MMOL/L
TRIGL SERPL-MCNC: 156 MG/DL

## 2024-05-22 ENCOUNTER — NON-APPOINTMENT (OUTPATIENT)
Age: 79
End: 2024-05-22

## 2024-05-22 ENCOUNTER — APPOINTMENT (OUTPATIENT)
Dept: CARDIOLOGY | Facility: CLINIC | Age: 79
End: 2024-05-22
Payer: MEDICARE

## 2024-05-22 VITALS
SYSTOLIC BLOOD PRESSURE: 136 MMHG | BODY MASS INDEX: 27.47 KG/M2 | OXYGEN SATURATION: 97 % | DIASTOLIC BLOOD PRESSURE: 78 MMHG | WEIGHT: 175 LBS | HEIGHT: 67 IN | HEART RATE: 95 BPM

## 2024-05-22 DIAGNOSIS — E78.5 HYPERLIPIDEMIA, UNSPECIFIED: ICD-10-CM

## 2024-05-22 DIAGNOSIS — I25.10 ATHEROSCLEROTIC HEART DISEASE OF NATIVE CORONARY ARTERY W/OUT ANGINA PECTORIS: ICD-10-CM

## 2024-05-22 DIAGNOSIS — I70.90 UNSPECIFIED ATHEROSCLEROSIS: ICD-10-CM

## 2024-05-22 PROCEDURE — 99214 OFFICE O/P EST MOD 30 MIN: CPT | Mod: 25

## 2024-05-22 PROCEDURE — 93000 ELECTROCARDIOGRAM COMPLETE: CPT

## 2024-05-22 NOTE — DISCUSSION/SUMMARY
[FreeTextEntry1] : From a cardiovascular standpoint, Logan is doing quite well.  He is active, and denies exertional symptoms at this time.  His blood pressure is near goal, and physical exam is unremarkable.  His EKG demonstrates a sinus rhythm, without obvious ischemia or chamber enlargement.  He had a recent calcium score of greater than 900,  A recent exercise nuclear stress without ischemia. He will remain on Lipitor 10 and take a baby aspirin every other day. His LDL has improved to right at goal.  I have stressed the importance of diet, exercise, to reduce his overall cardiovascular risk. He will have a carotid Doppler.  We will speak after the above testing, and I will see him again in 6 months. [EKG obtained to assist in diagnosis and management of assessed problem(s)] : EKG obtained to assist in diagnosis and management of assessed problem(s)

## 2024-05-22 NOTE — HISTORY OF PRESENT ILLNESS
[FreeTextEntry1] : Logan is a pleasant 78-year-old male here for follow up evaluation.  He has a history of prostate cancer, status post Lupron/radiation therapy.  He has a history of insomnia and has used medical marijuana in the past.  He also has a history of abdominal cramps, and uses hycosamine as needed.  He most recently had a laparoscopic cholecystectomy in April of the 2023.  I last saw him 11/23.  From a cardiovascular standpoint, he has been doing well.  He is quite active, uses a treadmill, and lifts weights without symptoms.  He has no chest pain, difficulty breathing, or palpitations.  His last lipid panel demonstrated an LDL of 85, with triglycerides of 347. He had a calcium score as part of a screening examination, which demonstrated a score of 961 (, left circumflex 225, and ).  He has since been started on Lipitor, and was taking a baby aspirin. His LDL is now 70's. Nuclear stress test in 12/23 without ischemia at >9 min of exercise.  He denies a family history of CAD, as well as toxic habits.

## 2024-05-28 ENCOUNTER — APPOINTMENT (OUTPATIENT)
Dept: CARDIOLOGY | Facility: CLINIC | Age: 79
End: 2024-05-28
Payer: MEDICARE

## 2024-05-28 PROCEDURE — 93880 EXTRACRANIAL BILAT STUDY: CPT

## 2024-05-30 DIAGNOSIS — E04.1 NONTOXIC SINGLE THYROID NODULE: ICD-10-CM

## 2024-06-05 ENCOUNTER — OUTPATIENT (OUTPATIENT)
Dept: OUTPATIENT SERVICES | Facility: HOSPITAL | Age: 79
LOS: 1 days | End: 2024-06-05
Payer: MEDICARE

## 2024-06-05 ENCOUNTER — APPOINTMENT (OUTPATIENT)
Dept: ULTRASOUND IMAGING | Facility: CLINIC | Age: 79
End: 2024-06-05
Payer: MEDICARE

## 2024-06-05 DIAGNOSIS — E04.1 NONTOXIC SINGLE THYROID NODULE: ICD-10-CM

## 2024-06-05 DIAGNOSIS — Z98.890 OTHER SPECIFIED POSTPROCEDURAL STATES: Chronic | ICD-10-CM

## 2024-06-05 PROCEDURE — 76536 US EXAM OF HEAD AND NECK: CPT | Mod: 26

## 2024-06-05 PROCEDURE — 76536 US EXAM OF HEAD AND NECK: CPT

## 2024-06-26 ENCOUNTER — NON-APPOINTMENT (OUTPATIENT)
Age: 79
End: 2024-06-26

## 2024-07-02 ENCOUNTER — NON-APPOINTMENT (OUTPATIENT)
Age: 79
End: 2024-07-02

## 2024-09-11 ENCOUNTER — RX RENEWAL (OUTPATIENT)
Age: 79
End: 2024-09-11

## 2024-11-12 ENCOUNTER — RESULT CHARGE (OUTPATIENT)
Age: 79
End: 2024-11-12

## 2024-11-13 ENCOUNTER — LABORATORY RESULT (OUTPATIENT)
Age: 79
End: 2024-11-13

## 2024-11-13 ENCOUNTER — NON-APPOINTMENT (OUTPATIENT)
Age: 79
End: 2024-11-13

## 2024-11-13 ENCOUNTER — APPOINTMENT (OUTPATIENT)
Dept: INTERNAL MEDICINE | Facility: CLINIC | Age: 79
End: 2024-11-13
Payer: MEDICARE

## 2024-11-13 VITALS
DIASTOLIC BLOOD PRESSURE: 78 MMHG | HEART RATE: 88 BPM | SYSTOLIC BLOOD PRESSURE: 138 MMHG | BODY MASS INDEX: 28.25 KG/M2 | HEIGHT: 67 IN | WEIGHT: 180 LBS | OXYGEN SATURATION: 98 % | TEMPERATURE: 98.1 F

## 2024-11-13 DIAGNOSIS — Z23 ENCOUNTER FOR IMMUNIZATION: ICD-10-CM

## 2024-11-13 DIAGNOSIS — R73.9 HYPERGLYCEMIA, UNSPECIFIED: ICD-10-CM

## 2024-11-13 DIAGNOSIS — F51.04 PSYCHOPHYSIOLOGIC INSOMNIA: ICD-10-CM

## 2024-11-13 DIAGNOSIS — Z79.899 OTHER LONG TERM (CURRENT) DRUG THERAPY: ICD-10-CM

## 2024-11-13 DIAGNOSIS — Z00.00 ENCOUNTER FOR GENERAL ADULT MEDICAL EXAMINATION W/OUT ABNORMAL FINDINGS: ICD-10-CM

## 2024-11-13 DIAGNOSIS — Z13.1 ENCOUNTER FOR SCREENING FOR DIABETES MELLITUS: ICD-10-CM

## 2024-11-13 DIAGNOSIS — Z12.11 ENCOUNTER FOR SCREENING FOR MALIGNANT NEOPLASM OF COLON: ICD-10-CM

## 2024-11-13 DIAGNOSIS — C61 MALIGNANT NEOPLASM OF PROSTATE: ICD-10-CM

## 2024-11-13 DIAGNOSIS — K21.9 GASTRO-ESOPHAGEAL REFLUX DISEASE W/OUT ESOPHAGITIS: ICD-10-CM

## 2024-11-13 PROCEDURE — 90662 IIV NO PRSV INCREASED AG IM: CPT

## 2024-11-13 PROCEDURE — 93000 ELECTROCARDIOGRAM COMPLETE: CPT

## 2024-11-13 PROCEDURE — G0008: CPT

## 2024-11-13 PROCEDURE — 36415 COLL VENOUS BLD VENIPUNCTURE: CPT

## 2024-11-13 PROCEDURE — G0439: CPT

## 2024-11-14 LAB
25(OH)D3 SERPL-MCNC: 46.7 NG/ML
ALBUMIN SERPL ELPH-MCNC: 4.8 G/DL
ALP BLD-CCNC: 82 U/L
ALT SERPL-CCNC: 19 U/L
ANION GAP SERPL CALC-SCNC: 15 MMOL/L
APPEARANCE: CLEAR
AST SERPL-CCNC: 21 U/L
BASOPHILS # BLD AUTO: 0.02 K/UL
BASOPHILS NFR BLD AUTO: 0.3 %
BILIRUB SERPL-MCNC: 1 MG/DL
BILIRUBIN URINE: NEGATIVE
BLOOD URINE: NEGATIVE
BUN SERPL-MCNC: 18 MG/DL
CALCIUM SERPL-MCNC: 11.1 MG/DL
CHLORIDE SERPL-SCNC: 105 MMOL/L
CHOLEST SERPL-MCNC: 150 MG/DL
CO2 SERPL-SCNC: 23 MMOL/L
COLOR: YELLOW
CREAT SERPL-MCNC: 1.2 MG/DL
EGFR: 63 ML/MIN/1.73M2
EOSINOPHIL # BLD AUTO: 0.48 K/UL
EOSINOPHIL NFR BLD AUTO: 6.9 %
ESTIMATED AVERAGE GLUCOSE: 114 MG/DL
GLUCOSE QUALITATIVE U: NEGATIVE MG/DL
GLUCOSE SERPL-MCNC: 103 MG/DL
HBA1C MFR BLD HPLC: 5.6 %
HCT VFR BLD CALC: 50.8 %
HDLC SERPL-MCNC: 42 MG/DL
HGB BLD-MCNC: 15 G/DL
IMM GRANULOCYTES NFR BLD AUTO: 0.4 %
KETONES URINE: NEGATIVE MG/DL
LDLC SERPL CALC-MCNC: 74 MG/DL
LEUKOCYTE ESTERASE URINE: NEGATIVE
LYMPHOCYTES # BLD AUTO: 1.49 K/UL
LYMPHOCYTES NFR BLD AUTO: 21.6 %
MAN DIFF?: NORMAL
MCHC RBC-ENTMCNC: 19.6 PG
MCHC RBC-ENTMCNC: 29.5 G/DL
MCV RBC AUTO: 66.4 FL
MONOCYTES # BLD AUTO: 0.84 K/UL
MONOCYTES NFR BLD AUTO: 12.2 %
NEUTROPHILS # BLD AUTO: 4.05 K/UL
NEUTROPHILS NFR BLD AUTO: 58.6 %
NITRITE URINE: NEGATIVE
NONHDLC SERPL-MCNC: 108 MG/DL
PH URINE: 5.5
PLATELET # BLD AUTO: 192 K/UL
POTASSIUM SERPL-SCNC: 5.2 MMOL/L
PROT SERPL-MCNC: 7.7 G/DL
PROTEIN URINE: NORMAL MG/DL
PSA SERPL-MCNC: 0.47 NG/ML
RBC # BLD: 7.65 M/UL
RBC # FLD: 20.2 %
SODIUM SERPL-SCNC: 142 MMOL/L
SPECIFIC GRAVITY URINE: 1.02
TRIGL SERPL-MCNC: 205 MG/DL
TSH SERPL-ACNC: 0.66 UIU/ML
UROBILINOGEN URINE: 0.2 MG/DL
WBC # FLD AUTO: 6.91 K/UL

## 2024-11-15 ENCOUNTER — APPOINTMENT (OUTPATIENT)
Dept: INTERNAL MEDICINE | Facility: CLINIC | Age: 79
End: 2024-11-15

## 2024-11-19 ENCOUNTER — APPOINTMENT (OUTPATIENT)
Dept: CARDIOLOGY | Facility: CLINIC | Age: 79
End: 2024-11-19
Payer: MEDICARE

## 2024-11-19 ENCOUNTER — NON-APPOINTMENT (OUTPATIENT)
Age: 79
End: 2024-11-19

## 2024-11-19 VITALS
OXYGEN SATURATION: 98 % | HEART RATE: 87 BPM | SYSTOLIC BLOOD PRESSURE: 153 MMHG | DIASTOLIC BLOOD PRESSURE: 89 MMHG | HEIGHT: 67 IN | BODY MASS INDEX: 27.78 KG/M2 | WEIGHT: 177 LBS

## 2024-11-19 DIAGNOSIS — E78.5 HYPERLIPIDEMIA, UNSPECIFIED: ICD-10-CM

## 2024-11-19 DIAGNOSIS — R07.89 OTHER CHEST PAIN: ICD-10-CM

## 2024-11-19 DIAGNOSIS — I25.10 ATHEROSCLEROTIC HEART DISEASE OF NATIVE CORONARY ARTERY W/OUT ANGINA PECTORIS: ICD-10-CM

## 2024-11-19 PROCEDURE — 93000 ELECTROCARDIOGRAM COMPLETE: CPT

## 2024-11-19 PROCEDURE — 99214 OFFICE O/P EST MOD 30 MIN: CPT | Mod: 25

## 2024-12-30 ENCOUNTER — NON-APPOINTMENT (OUTPATIENT)
Age: 79
End: 2024-12-30

## 2025-02-10 ENCOUNTER — NON-APPOINTMENT (OUTPATIENT)
Age: 80
End: 2025-02-10

## 2025-02-18 ENCOUNTER — APPOINTMENT (OUTPATIENT)
Dept: CARDIOLOGY | Facility: CLINIC | Age: 80
End: 2025-02-18

## 2025-02-27 ENCOUNTER — APPOINTMENT (OUTPATIENT)
Dept: ORTHOPEDIC SURGERY | Facility: CLINIC | Age: 80
End: 2025-02-27
Payer: MEDICARE

## 2025-02-27 DIAGNOSIS — M43.06 SPONDYLOLYSIS, LUMBAR REGION: ICD-10-CM

## 2025-02-27 DIAGNOSIS — M43.17 SPONDYLOLISTHESIS, LUMBOSACRAL REGION: ICD-10-CM

## 2025-02-27 DIAGNOSIS — M51.369: ICD-10-CM

## 2025-02-27 PROCEDURE — 72100 X-RAY EXAM L-S SPINE 2/3 VWS: CPT

## 2025-02-27 PROCEDURE — 99213 OFFICE O/P EST LOW 20 MIN: CPT

## 2025-02-27 RX ORDER — METHYLPREDNISOLONE 4 MG/1
4 TABLET ORAL
Qty: 1 | Refills: 1 | Status: ACTIVE | COMMUNITY
Start: 2025-02-27 | End: 1900-01-01

## 2025-03-20 ENCOUNTER — APPOINTMENT (OUTPATIENT)
Dept: CARDIOLOGY | Facility: CLINIC | Age: 80
End: 2025-03-20
Payer: MEDICARE

## 2025-03-20 PROCEDURE — 93306 TTE W/DOPPLER COMPLETE: CPT

## 2025-03-20 PROCEDURE — 93015 CV STRESS TEST SUPVJ I&R: CPT

## 2025-04-25 ENCOUNTER — NON-APPOINTMENT (OUTPATIENT)
Age: 80
End: 2025-04-25

## 2025-04-29 ENCOUNTER — APPOINTMENT (OUTPATIENT)
Dept: INTERNAL MEDICINE | Facility: CLINIC | Age: 80
End: 2025-04-29

## 2025-05-01 LAB
25(OH)D3 SERPL-MCNC: 44.5 NG/ML
ALBUMIN SERPL ELPH-MCNC: 4.5 G/DL
ALP BLD-CCNC: 82 U/L
ALT SERPL-CCNC: 24 U/L
ANION GAP SERPL CALC-SCNC: 16 MMOL/L
AST SERPL-CCNC: 27 U/L
BILIRUB SERPL-MCNC: 0.8 MG/DL
BUN SERPL-MCNC: 17 MG/DL
CALCIUM SERPL-MCNC: 10.6 MG/DL
CHLORIDE SERPL-SCNC: 105 MMOL/L
CHOLEST SERPL-MCNC: 133 MG/DL
CO2 SERPL-SCNC: 23 MMOL/L
CREAT SERPL-MCNC: 1.27 MG/DL
EGFRCR SERPLBLD CKD-EPI 2021: 58 ML/MIN/1.73M2
ESTIMATED AVERAGE GLUCOSE: 117 MG/DL
GLUCOSE SERPL-MCNC: 125 MG/DL
HBA1C MFR BLD HPLC: 5.7 %
HDLC SERPL-MCNC: 36 MG/DL
LDLC SERPL-MCNC: 73 MG/DL
NONHDLC SERPL-MCNC: 98 MG/DL
POTASSIUM SERPL-SCNC: 5.5 MMOL/L
PROT SERPL-MCNC: 7.2 G/DL
SODIUM SERPL-SCNC: 144 MMOL/L
TRIGL SERPL-MCNC: 138 MG/DL
TSH SERPL-ACNC: 0.66 UIU/ML

## 2025-05-02 LAB
HCT VFR BLD CALC: 48.3 %
HGB BLD-MCNC: 14.3 G/DL
MCHC RBC-ENTMCNC: 19.4 PG
MCHC RBC-ENTMCNC: 29.6 G/DL
MCV RBC AUTO: 65.5 FL
PLATELET # BLD AUTO: 222 K/UL
RBC # BLD: 7.37 M/UL
RBC # FLD: 19.6 %
WBC # FLD AUTO: 5.45 K/UL

## 2025-05-06 ENCOUNTER — NON-APPOINTMENT (OUTPATIENT)
Age: 80
End: 2025-05-06

## 2025-05-06 ENCOUNTER — APPOINTMENT (OUTPATIENT)
Dept: CARDIOLOGY | Facility: CLINIC | Age: 80
End: 2025-05-06
Payer: MEDICARE

## 2025-05-06 VITALS
BODY MASS INDEX: 27.31 KG/M2 | WEIGHT: 174 LBS | SYSTOLIC BLOOD PRESSURE: 130 MMHG | DIASTOLIC BLOOD PRESSURE: 77 MMHG | HEIGHT: 67 IN | HEART RATE: 88 BPM | OXYGEN SATURATION: 95 %

## 2025-05-06 VITALS — SYSTOLIC BLOOD PRESSURE: 125 MMHG | DIASTOLIC BLOOD PRESSURE: 78 MMHG

## 2025-05-06 DIAGNOSIS — I25.10 ATHEROSCLEROTIC HEART DISEASE OF NATIVE CORONARY ARTERY W/OUT ANGINA PECTORIS: ICD-10-CM

## 2025-05-06 DIAGNOSIS — R94.31 ABNORMAL ELECTROCARDIOGRAM [ECG] [EKG]: ICD-10-CM

## 2025-05-06 PROCEDURE — 93000 ELECTROCARDIOGRAM COMPLETE: CPT

## 2025-05-06 PROCEDURE — 99214 OFFICE O/P EST MOD 30 MIN: CPT | Mod: 25

## 2025-08-22 ENCOUNTER — RX RENEWAL (OUTPATIENT)
Age: 80
End: 2025-08-22

## 2025-09-02 ENCOUNTER — APPOINTMENT (OUTPATIENT)
Dept: ORTHOPEDIC SURGERY | Facility: CLINIC | Age: 80
End: 2025-09-02
Payer: MEDICARE

## 2025-09-02 VITALS — WEIGHT: 174 LBS | HEIGHT: 67 IN | BODY MASS INDEX: 27.31 KG/M2

## 2025-09-02 DIAGNOSIS — M47.812 SPONDYLOSIS W/OUT MYELOPATHY OR RADICULOPATHY, CERVICAL REGION: ICD-10-CM

## 2025-09-02 PROCEDURE — 99214 OFFICE O/P EST MOD 30 MIN: CPT | Mod: 25

## 2025-09-02 PROCEDURE — 72040 X-RAY EXAM NECK SPINE 2-3 VW: CPT

## 2025-09-02 RX ORDER — METHYLPREDNISOLONE 4 MG/1
4 TABLET ORAL
Qty: 1 | Refills: 1 | Status: ACTIVE | COMMUNITY
Start: 2025-09-02 | End: 1900-01-01

## (undated) DEVICE — TUBING INSUFFLATION LAP FILTER 10FT

## (undated) DEVICE — SOL IRR POUR NS 0.9% 1000ML

## (undated) DEVICE — DRAPE TOWEL BLUE 17" X 24"

## (undated) DEVICE — ENDOCATCH 10MM SPECIMEN POUCH

## (undated) DEVICE — ELCTR CORD FOOTSWITCH 1PLR LAPSCP 10FT

## (undated) DEVICE — PACK GENERAL LAPAROSCOPY

## (undated) DEVICE — MARKING PEN W RULER

## (undated) DEVICE — DRSG TELFA 3 X 8

## (undated) DEVICE — TROCAR APPLIED MEDICAL KII BALLOON BLUNT TIP 12MM X 100MM

## (undated) DEVICE — GOWN TRIMAX LG

## (undated) DEVICE — INSUFFLATION NDL COVIDIEN STEP 14G FOR STEP/VERSASTEP

## (undated) DEVICE — DRAPE GENERAL ENDOSCOPY

## (undated) DEVICE — TROCAR ETHICON ENDOPATH XCEL BLADELESS 5MM X 100MM STABILITY

## (undated) DEVICE — DRSG OPSITE 2.5 X 2"

## (undated) DEVICE — SOL IRR POUR H2O 250ML

## (undated) DEVICE — SUT PDS II 0 18" ENDOLOOP LIGATURE

## (undated) DEVICE — TROCAR COVIDIEN VERSASTEP PLUS 11MM STANDARD

## (undated) DEVICE — SOL IRR POUR NS 0.9% 500ML

## (undated) DEVICE — GLV 7.5 PROTEXIS (WHITE)

## (undated) DEVICE — VENODYNE/SCD SLEEVE CALF LARGE

## (undated) DEVICE — ELCTR BOVIE PENCIL HANDPIECE

## (undated) DEVICE — SUT POLYSORB 0 36" GU-46

## (undated) DEVICE — MEDICATION LABELS W MARKER

## (undated) DEVICE — APPLICATOR SURGICEL LAP TROCAR POINT 2.5MM X 150MM

## (undated) DEVICE — TROCAR COVIDIEN VERSAPORT BLADELESS OPTICAL 5MM STANDARD

## (undated) DEVICE — POSITIONER FOAM EGG CRATE ULNAR 2PCS (PINK)

## (undated) DEVICE — SHEARS COVIDIEN ENDO SHEAR 5MM X 31CM W UNIPOLAR CAUTERY

## (undated) DEVICE — DRAPE MAYO STAND 30"

## (undated) DEVICE — WARMING BLANKET UPPER ADULT

## (undated) DEVICE — PREP CHLORAPREP HI-LITE ORANGE 26ML

## (undated) DEVICE — D HELP - CLEARVIEW CLEARIFY SYSTEM

## (undated) DEVICE — DRSG STERISTRIPS 0.5 X 4"

## (undated) DEVICE — DRAPE INSTRUMENT POUCH 6.75" X 11"

## (undated) DEVICE — SUT MONOCRYL 4-0 27" PS-2 UNDYED